# Patient Record
Sex: MALE | Race: WHITE | Employment: FULL TIME | URBAN - METROPOLITAN AREA
[De-identification: names, ages, dates, MRNs, and addresses within clinical notes are randomized per-mention and may not be internally consistent; named-entity substitution may affect disease eponyms.]

---

## 2022-05-21 ENCOUNTER — APPOINTMENT (OUTPATIENT)
Dept: GENERAL RADIOLOGY | Age: 47
DRG: 552 | End: 2022-05-21
Payer: COMMERCIAL

## 2022-05-21 ENCOUNTER — APPOINTMENT (OUTPATIENT)
Dept: MRI IMAGING | Age: 47
DRG: 552 | End: 2022-05-21
Payer: COMMERCIAL

## 2022-05-21 ENCOUNTER — APPOINTMENT (OUTPATIENT)
Dept: CT IMAGING | Age: 47
DRG: 552 | End: 2022-05-21
Payer: COMMERCIAL

## 2022-05-21 ENCOUNTER — HOSPITAL ENCOUNTER (INPATIENT)
Age: 47
LOS: 3 days | Discharge: HOME | DRG: 552 | End: 2022-05-24
Attending: STUDENT IN AN ORGANIZED HEALTH CARE EDUCATION/TRAINING PROGRAM | Admitting: INTERNAL MEDICINE
Payer: COMMERCIAL

## 2022-05-21 DIAGNOSIS — S00.83XA FACIAL CONTUSION, INITIAL ENCOUNTER: ICD-10-CM

## 2022-05-21 DIAGNOSIS — S06.0X9A CONCUSSION WITH LOSS OF CONSCIOUSNESS, INITIAL ENCOUNTER: ICD-10-CM

## 2022-05-21 DIAGNOSIS — S22.061A CLOSED STABLE BURST FRACTURE OF SEVENTH THORACIC VERTEBRA, INITIAL ENCOUNTER (HCC): Primary | ICD-10-CM

## 2022-05-21 DIAGNOSIS — S01.81XA FACIAL LACERATION, INITIAL ENCOUNTER: ICD-10-CM
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
## 2022-05-21 PROBLEM — S22.069A: Status: ACTIVE | Noted: 2022-05-21

## 2022-05-21 LAB
ALBUMIN SERPL-MCNC: 4.1 G/DL (ref 3.4–5)
ALP BLD-CCNC: 46 U/L (ref 40–129)
ALT SERPL-CCNC: 20 U/L (ref 10–40)
ANION GAP SERPL CALCULATED.3IONS-SCNC: 17 MMOL/L (ref 3–16)
ANION GAP SERPL CALCULATED.3IONS-SCNC: 18 MMOL/L (ref 3–16)
APTT: 28.2 SEC (ref 26.2–38.6)
AST SERPL-CCNC: 47 U/L (ref 15–37)
BASOPHILS ABSOLUTE: 0 K/UL (ref 0–0.2)
BASOPHILS RELATIVE PERCENT: 0.2 %
BILIRUB SERPL-MCNC: 0.5 MG/DL (ref 0–1)
BILIRUBIN DIRECT: <0.2 MG/DL (ref 0–0.3)
BILIRUBIN, INDIRECT: ABNORMAL MG/DL (ref 0–1)
BUN BLDV-MCNC: 11 MG/DL (ref 7–20)
BUN BLDV-MCNC: 15 MG/DL (ref 7–20)
CALCIUM SERPL-MCNC: 8.8 MG/DL (ref 8.3–10.6)
CALCIUM SERPL-MCNC: 9.1 MG/DL (ref 8.3–10.6)
CHLORIDE BLD-SCNC: 100 MMOL/L (ref 99–110)
CHLORIDE BLD-SCNC: 104 MMOL/L (ref 99–110)
CO2: 18 MMOL/L (ref 21–32)
CO2: 19 MMOL/L (ref 21–32)
CREAT SERPL-MCNC: 0.8 MG/DL (ref 0.9–1.3)
CREAT SERPL-MCNC: 1 MG/DL (ref 0.9–1.3)
EOSINOPHILS ABSOLUTE: 0 K/UL (ref 0–0.6)
EOSINOPHILS RELATIVE PERCENT: 0.2 %
GFR AFRICAN AMERICAN: >60
GFR AFRICAN AMERICAN: >60
GFR NON-AFRICAN AMERICAN: >60
GFR NON-AFRICAN AMERICAN: >60
GLUCOSE BLD-MCNC: 113 MG/DL (ref 70–99)
GLUCOSE BLD-MCNC: 124 MG/DL (ref 70–99)
HCT VFR BLD CALC: 44.2 % (ref 40.5–52.5)
HEMOGLOBIN: 14.6 G/DL (ref 13.5–17.5)
INR BLD: 1.08 (ref 0.88–1.12)
LIPASE: 19 U/L (ref 13–60)
LYMPHOCYTES ABSOLUTE: 1.7 K/UL (ref 1–5.1)
LYMPHOCYTES RELATIVE PERCENT: 16.8 %
MCH RBC QN AUTO: 31 PG (ref 26–34)
MCHC RBC AUTO-ENTMCNC: 33.1 G/DL (ref 31–36)
MCV RBC AUTO: 93.6 FL (ref 80–100)
MONOCYTES ABSOLUTE: 0.6 K/UL (ref 0–1.3)
MONOCYTES RELATIVE PERCENT: 6.1 %
NEUTROPHILS ABSOLUTE: 7.8 K/UL (ref 1.7–7.7)
NEUTROPHILS RELATIVE PERCENT: 76.7 %
PDW BLD-RTO: 13.4 % (ref 12.4–15.4)
PLATELET # BLD: 233 K/UL (ref 135–450)
PMV BLD AUTO: 8.7 FL (ref 5–10.5)
POTASSIUM SERPL-SCNC: 3.8 MMOL/L (ref 3.5–5.1)
POTASSIUM SERPL-SCNC: 4.4 MMOL/L (ref 3.5–5.1)
PROTHROMBIN TIME: 12.2 SEC (ref 9.9–12.7)
RBC # BLD: 4.72 M/UL (ref 4.2–5.9)
SODIUM BLD-SCNC: 136 MMOL/L (ref 136–145)
SODIUM BLD-SCNC: 140 MMOL/L (ref 136–145)
TOTAL CK: 887 U/L (ref 39–308)
TOTAL PROTEIN: 6.7 G/DL (ref 6.4–8.2)
TROPONIN: <0.01 NG/ML
WBC # BLD: 10.2 K/UL (ref 4–11)

## 2022-05-21 PROCEDURE — 90715 TDAP VACCINE 7 YRS/> IM: CPT | Performed by: STUDENT IN AN ORGANIZED HEALTH CARE EDUCATION/TRAINING PROGRAM

## 2022-05-21 PROCEDURE — 76376 3D RENDER W/INTRP POSTPROCES: CPT

## 2022-05-21 PROCEDURE — 2580000003 HC RX 258: Performed by: INTERNAL MEDICINE

## 2022-05-21 PROCEDURE — 85610 PROTHROMBIN TIME: CPT

## 2022-05-21 PROCEDURE — 6360000002 HC RX W HCPCS

## 2022-05-21 PROCEDURE — 82550 ASSAY OF CK (CPK): CPT

## 2022-05-21 PROCEDURE — 72125 CT NECK SPINE W/O DYE: CPT

## 2022-05-21 PROCEDURE — 36415 COLL VENOUS BLD VENIPUNCTURE: CPT

## 2022-05-21 PROCEDURE — 80048 BASIC METABOLIC PNL TOTAL CA: CPT

## 2022-05-21 PROCEDURE — 85730 THROMBOPLASTIN TIME PARTIAL: CPT

## 2022-05-21 PROCEDURE — 96376 TX/PRO/DX INJ SAME DRUG ADON: CPT

## 2022-05-21 PROCEDURE — 70486 CT MAXILLOFACIAL W/O DYE: CPT

## 2022-05-21 PROCEDURE — 6360000004 HC RX CONTRAST MEDICATION: Performed by: STUDENT IN AN ORGANIZED HEALTH CARE EDUCATION/TRAINING PROGRAM

## 2022-05-21 PROCEDURE — 6360000002 HC RX W HCPCS: Performed by: INTERNAL MEDICINE

## 2022-05-21 PROCEDURE — 80076 HEPATIC FUNCTION PANEL: CPT

## 2022-05-21 PROCEDURE — 83690 ASSAY OF LIPASE: CPT

## 2022-05-21 PROCEDURE — 70450 CT HEAD/BRAIN W/O DYE: CPT

## 2022-05-21 PROCEDURE — 99223 1ST HOSP IP/OBS HIGH 75: CPT | Performed by: SURGERY

## 2022-05-21 PROCEDURE — 6360000002 HC RX W HCPCS: Performed by: STUDENT IN AN ORGANIZED HEALTH CARE EDUCATION/TRAINING PROGRAM

## 2022-05-21 PROCEDURE — 84484 ASSAY OF TROPONIN QUANT: CPT

## 2022-05-21 PROCEDURE — 90471 IMMUNIZATION ADMIN: CPT | Performed by: STUDENT IN AN ORGANIZED HEALTH CARE EDUCATION/TRAINING PROGRAM

## 2022-05-21 PROCEDURE — 72146 MRI CHEST SPINE W/O DYE: CPT

## 2022-05-21 PROCEDURE — 96375 TX/PRO/DX INJ NEW DRUG ADDON: CPT

## 2022-05-21 PROCEDURE — 96374 THER/PROPH/DIAG INJ IV PUSH: CPT

## 2022-05-21 PROCEDURE — 6370000000 HC RX 637 (ALT 250 FOR IP): Performed by: INTERNAL MEDICINE

## 2022-05-21 PROCEDURE — 85025 COMPLETE CBC W/AUTO DIFF WBC: CPT

## 2022-05-21 PROCEDURE — 73030 X-RAY EXAM OF SHOULDER: CPT

## 2022-05-21 PROCEDURE — 1200000000 HC SEMI PRIVATE

## 2022-05-21 PROCEDURE — 71260 CT THORAX DX C+: CPT

## 2022-05-21 PROCEDURE — 99285 EMERGENCY DEPT VISIT HI MDM: CPT

## 2022-05-21 RX ORDER — OXYCODONE HYDROCHLORIDE 5 MG/1
5 TABLET ORAL EVERY 4 HOURS PRN
Status: DISCONTINUED | OUTPATIENT
Start: 2022-05-21 | End: 2022-05-24 | Stop reason: HOSPADM

## 2022-05-21 RX ORDER — SODIUM CHLORIDE 0.9 % (FLUSH) 0.9 %
5-40 SYRINGE (ML) INJECTION EVERY 12 HOURS SCHEDULED
Status: DISCONTINUED | OUTPATIENT
Start: 2022-05-21 | End: 2022-05-24 | Stop reason: HOSPADM

## 2022-05-21 RX ORDER — ACETAMINOPHEN 325 MG/1
650 TABLET ORAL EVERY 6 HOURS PRN
Status: DISCONTINUED | OUTPATIENT
Start: 2022-05-21 | End: 2022-05-24 | Stop reason: HOSPADM

## 2022-05-21 RX ORDER — OMEPRAZOLE 20 MG/1
20 CAPSULE, DELAYED RELEASE ORAL DAILY
COMMUNITY
Start: 2021-01-22

## 2022-05-21 RX ORDER — ACETAMINOPHEN 650 MG/1
650 SUPPOSITORY RECTAL EVERY 6 HOURS PRN
Status: DISCONTINUED | OUTPATIENT
Start: 2022-05-21 | End: 2022-05-24 | Stop reason: HOSPADM

## 2022-05-21 RX ORDER — SODIUM CHLORIDE 9 MG/ML
INJECTION, SOLUTION INTRAVENOUS PRN
Status: DISCONTINUED | OUTPATIENT
Start: 2022-05-21 | End: 2022-05-24 | Stop reason: HOSPADM

## 2022-05-21 RX ORDER — GINSENG 100 MG
CAPSULE ORAL 2 TIMES DAILY
Status: DISCONTINUED | OUTPATIENT
Start: 2022-05-21 | End: 2022-05-24 | Stop reason: HOSPADM

## 2022-05-21 RX ORDER — ONDANSETRON 2 MG/ML
4 INJECTION INTRAMUSCULAR; INTRAVENOUS ONCE
Status: COMPLETED | OUTPATIENT
Start: 2022-05-21 | End: 2022-05-21

## 2022-05-21 RX ORDER — ONDANSETRON 4 MG/1
4 TABLET, ORALLY DISINTEGRATING ORAL EVERY 8 HOURS PRN
Status: DISCONTINUED | OUTPATIENT
Start: 2022-05-21 | End: 2022-05-24 | Stop reason: HOSPADM

## 2022-05-21 RX ORDER — FENTANYL CITRATE 50 UG/ML
75 INJECTION, SOLUTION INTRAMUSCULAR; INTRAVENOUS ONCE
Status: COMPLETED | OUTPATIENT
Start: 2022-05-21 | End: 2022-05-21

## 2022-05-21 RX ORDER — OXYCODONE HYDROCHLORIDE 5 MG/1
10 TABLET ORAL EVERY 4 HOURS PRN
Status: DISCONTINUED | OUTPATIENT
Start: 2022-05-21 | End: 2022-05-24 | Stop reason: HOSPADM

## 2022-05-21 RX ORDER — SODIUM CHLORIDE 0.9 % (FLUSH) 0.9 %
5-40 SYRINGE (ML) INJECTION PRN
Status: DISCONTINUED | OUTPATIENT
Start: 2022-05-21 | End: 2022-05-24 | Stop reason: HOSPADM

## 2022-05-21 RX ORDER — ONDANSETRON 2 MG/ML
4 INJECTION INTRAMUSCULAR; INTRAVENOUS EVERY 6 HOURS PRN
Status: DISCONTINUED | OUTPATIENT
Start: 2022-05-21 | End: 2022-05-24 | Stop reason: HOSPADM

## 2022-05-21 RX ORDER — POLYETHYLENE GLYCOL 3350 17 G/17G
17 POWDER, FOR SOLUTION ORAL DAILY PRN
Status: DISCONTINUED | OUTPATIENT
Start: 2022-05-21 | End: 2022-05-24 | Stop reason: HOSPADM

## 2022-05-21 RX ORDER — LORATADINE 10 MG/1
10 TABLET ORAL PRN
COMMUNITY
Start: 2022-05-12

## 2022-05-21 RX ORDER — ONDANSETRON 2 MG/ML
INJECTION INTRAMUSCULAR; INTRAVENOUS
Status: COMPLETED
Start: 2022-05-21 | End: 2022-05-21

## 2022-05-21 RX ORDER — LORAZEPAM 2 MG/ML
1 INJECTION INTRAMUSCULAR ONCE
Status: COMPLETED | OUTPATIENT
Start: 2022-05-21 | End: 2022-05-21

## 2022-05-21 RX ORDER — SODIUM CHLORIDE, SODIUM LACTATE, POTASSIUM CHLORIDE, CALCIUM CHLORIDE 600; 310; 30; 20 MG/100ML; MG/100ML; MG/100ML; MG/100ML
INJECTION, SOLUTION INTRAVENOUS CONTINUOUS
Status: DISCONTINUED | OUTPATIENT
Start: 2022-05-21 | End: 2022-05-24

## 2022-05-21 RX ADMIN — FENTANYL CITRATE 75 MCG: 50 INJECTION INTRAMUSCULAR; INTRAVENOUS at 14:32

## 2022-05-21 RX ADMIN — SODIUM CHLORIDE, PRESERVATIVE FREE 10 ML: 5 INJECTION INTRAVENOUS at 20:34

## 2022-05-21 RX ADMIN — IOPAMIDOL 80 ML: 755 INJECTION, SOLUTION INTRAVENOUS at 15:32

## 2022-05-21 RX ADMIN — ONDANSETRON 4 MG: 2 INJECTION INTRAMUSCULAR; INTRAVENOUS at 15:41

## 2022-05-21 RX ADMIN — SODIUM CHLORIDE, POTASSIUM CHLORIDE, SODIUM LACTATE AND CALCIUM CHLORIDE: 600; 310; 30; 20 INJECTION, SOLUTION INTRAVENOUS at 21:44

## 2022-05-21 RX ADMIN — HYDROMORPHONE HYDROCHLORIDE 1 MG: 1 INJECTION, SOLUTION INTRAMUSCULAR; INTRAVENOUS; SUBCUTANEOUS at 20:32

## 2022-05-21 RX ADMIN — ONDANSETRON 4 MG: 2 INJECTION INTRAMUSCULAR; INTRAVENOUS at 20:32

## 2022-05-21 RX ADMIN — OXYCODONE 10 MG: 5 TABLET ORAL at 21:45

## 2022-05-21 RX ADMIN — FENTANYL CITRATE 75 MCG: 50 INJECTION INTRAMUSCULAR; INTRAVENOUS at 14:07

## 2022-05-21 RX ADMIN — TETANUS TOXOID, REDUCED DIPHTHERIA TOXOID AND ACELLULAR PERTUSSIS VACCINE, ADSORBED 0.5 ML: 5; 2.5; 8; 8; 2.5 SUSPENSION INTRAMUSCULAR at 17:22

## 2022-05-21 RX ADMIN — LORAZEPAM 1 MG: 2 INJECTION INTRAMUSCULAR; INTRAVENOUS at 18:17

## 2022-05-21 RX ADMIN — METHOCARBAMOL 750 MG: 100 INJECTION, SOLUTION INTRAMUSCULAR; INTRAVENOUS at 21:45

## 2022-05-21 RX ADMIN — ONDANSETRON 4 MG: 2 INJECTION INTRAMUSCULAR; INTRAVENOUS at 14:08

## 2022-05-21 ASSESSMENT — PAIN DESCRIPTION - LOCATION
LOCATION: BACK
LOCATION: BACK;ABDOMEN
LOCATION: FACE
LOCATION: BACK;ABDOMEN

## 2022-05-21 ASSESSMENT — PAIN SCALES - GENERAL
PAINLEVEL_OUTOF10: 7
PAINLEVEL_OUTOF10: 4
PAINLEVEL_OUTOF10: 8
PAINLEVEL_OUTOF10: 10
PAINLEVEL_OUTOF10: 9
PAINLEVEL_OUTOF10: 10

## 2022-05-21 ASSESSMENT — PAIN DESCRIPTION - ONSET: ONSET: ON-GOING

## 2022-05-21 ASSESSMENT — PAIN DESCRIPTION - FREQUENCY: FREQUENCY: CONTINUOUS

## 2022-05-21 ASSESSMENT — PAIN DESCRIPTION - DIRECTION: RADIATING_TOWARDS: ABDOMEN

## 2022-05-21 ASSESSMENT — PAIN DESCRIPTION - DESCRIPTORS
DESCRIPTORS: THROBBING;STABBING
DESCRIPTORS: THROBBING;ACHING
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
## 2022-05-21 NOTE — LETTER
[facility] 5T Ortho/Neuro  [address]  [facility] [address]  Phone: [phone]             May 24, 2022    Patient: Owen Jasso   YOB: 1975   Date of Visit: 5/21/2022       To Whom It May Concern:    Owen Jasso was seen and treated in our facility  beginning 5/21/2022 until 5/24/2022. He needs to remain off work for 2 weeks until cleared by neurosurgery.        Sincerely,       Phyllis Becerra MD         Signature:__________________________________
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
## 2022-05-22 ENCOUNTER — APPOINTMENT (OUTPATIENT)
Dept: GENERAL RADIOLOGY | Age: 47
DRG: 552 | End: 2022-05-22
Payer: COMMERCIAL

## 2022-05-22 PROBLEM — K21.9 GASTROESOPHAGEAL REFLUX DISEASE: Status: ACTIVE | Noted: 2021-01-22

## 2022-05-22 PROBLEM — J30.1 SEASONAL ALLERGIC RHINITIS DUE TO POLLEN: Status: ACTIVE | Noted: 2021-01-22

## 2022-05-22 PROBLEM — K64.8 INTERNAL HEMORRHOIDS WITHOUT COMPLICATION: Status: ACTIVE | Noted: 2021-01-22

## 2022-05-22 PROBLEM — S22.061A CLOSED STABLE BURST FRACTURE OF T7 VERTEBRA (HCC): Status: ACTIVE | Noted: 2022-05-21

## 2022-05-22 LAB
A/G RATIO: 1.7 (ref 1.1–2.2)
ALBUMIN SERPL-MCNC: 4 G/DL (ref 3.4–5)
ALP BLD-CCNC: 45 U/L (ref 40–129)
ALT SERPL-CCNC: 17 U/L (ref 10–40)
ANION GAP SERPL CALCULATED.3IONS-SCNC: 11 MMOL/L (ref 3–16)
AST SERPL-CCNC: 33 U/L (ref 15–37)
BASOPHILS ABSOLUTE: 0 K/UL (ref 0–0.2)
BASOPHILS RELATIVE PERCENT: 0.2 %
BILIRUB SERPL-MCNC: 1.1 MG/DL (ref 0–1)
BUN BLDV-MCNC: 8 MG/DL (ref 7–20)
CALCIUM SERPL-MCNC: 9 MG/DL (ref 8.3–10.6)
CHLORIDE BLD-SCNC: 105 MMOL/L (ref 99–110)
CO2: 24 MMOL/L (ref 21–32)
CREAT SERPL-MCNC: 1.1 MG/DL (ref 0.9–1.3)
EOSINOPHILS ABSOLUTE: 0 K/UL (ref 0–0.6)
EOSINOPHILS RELATIVE PERCENT: 0.2 %
GFR AFRICAN AMERICAN: >60
GFR NON-AFRICAN AMERICAN: >60
GLUCOSE BLD-MCNC: 95 MG/DL (ref 70–99)
HCT VFR BLD CALC: 41.9 % (ref 40.5–52.5)
HEMOGLOBIN: 14.2 G/DL (ref 13.5–17.5)
LYMPHOCYTES ABSOLUTE: 1.7 K/UL (ref 1–5.1)
LYMPHOCYTES RELATIVE PERCENT: 22.4 %
MAGNESIUM: 2.2 MG/DL (ref 1.8–2.4)
MCH RBC QN AUTO: 31.2 PG (ref 26–34)
MCHC RBC AUTO-ENTMCNC: 33.8 G/DL (ref 31–36)
MCV RBC AUTO: 92.6 FL (ref 80–100)
MONOCYTES ABSOLUTE: 0.7 K/UL (ref 0–1.3)
MONOCYTES RELATIVE PERCENT: 10 %
NEUTROPHILS ABSOLUTE: 5 K/UL (ref 1.7–7.7)
NEUTROPHILS RELATIVE PERCENT: 67.2 %
PDW BLD-RTO: 13.2 % (ref 12.4–15.4)
PHOSPHORUS: 3.1 MG/DL (ref 2.5–4.9)
PLATELET # BLD: 211 K/UL (ref 135–450)
PMV BLD AUTO: 8.5 FL (ref 5–10.5)
POTASSIUM REFLEX MAGNESIUM: 4.2 MMOL/L (ref 3.5–5.1)
RBC # BLD: 4.53 M/UL (ref 4.2–5.9)
SODIUM BLD-SCNC: 140 MMOL/L (ref 136–145)
TOTAL CK: 770 U/L (ref 39–308)
TOTAL PROTEIN: 6.4 G/DL (ref 6.4–8.2)
WBC # BLD: 7.4 K/UL (ref 4–11)

## 2022-05-22 PROCEDURE — 1200000000 HC SEMI PRIVATE

## 2022-05-22 PROCEDURE — 36415 COLL VENOUS BLD VENIPUNCTURE: CPT

## 2022-05-22 PROCEDURE — 84100 ASSAY OF PHOSPHORUS: CPT

## 2022-05-22 PROCEDURE — 2580000003 HC RX 258: Performed by: INTERNAL MEDICINE

## 2022-05-22 PROCEDURE — 99232 SBSQ HOSP IP/OBS MODERATE 35: CPT | Performed by: SURGERY

## 2022-05-22 PROCEDURE — 83735 ASSAY OF MAGNESIUM: CPT

## 2022-05-22 PROCEDURE — 80053 COMPREHEN METABOLIC PANEL: CPT

## 2022-05-22 PROCEDURE — 6370000000 HC RX 637 (ALT 250 FOR IP): Performed by: INTERNAL MEDICINE

## 2022-05-22 PROCEDURE — 6370000000 HC RX 637 (ALT 250 FOR IP): Performed by: STUDENT IN AN ORGANIZED HEALTH CARE EDUCATION/TRAINING PROGRAM

## 2022-05-22 PROCEDURE — 72070 X-RAY EXAM THORAC SPINE 2VWS: CPT

## 2022-05-22 PROCEDURE — 85025 COMPLETE CBC W/AUTO DIFF WBC: CPT

## 2022-05-22 PROCEDURE — 6360000002 HC RX W HCPCS: Performed by: INTERNAL MEDICINE

## 2022-05-22 PROCEDURE — 82550 ASSAY OF CK (CPK): CPT

## 2022-05-22 RX ORDER — SENNA AND DOCUSATE SODIUM 50; 8.6 MG/1; MG/1
2 TABLET, FILM COATED ORAL 2 TIMES DAILY
Status: DISCONTINUED | OUTPATIENT
Start: 2022-05-22 | End: 2022-05-24 | Stop reason: HOSPADM

## 2022-05-22 RX ORDER — POLYETHYLENE GLYCOL 3350 17 G/17G
17 POWDER, FOR SOLUTION ORAL DAILY
Status: DISCONTINUED | OUTPATIENT
Start: 2022-05-22 | End: 2022-05-24 | Stop reason: HOSPADM

## 2022-05-22 RX ADMIN — POLYETHYLENE GLYCOL 3350 17 G: 17 POWDER, FOR SOLUTION ORAL at 18:14

## 2022-05-22 RX ADMIN — BACITRACIN: 500 OINTMENT TOPICAL at 20:24

## 2022-05-22 RX ADMIN — HYDROMORPHONE HYDROCHLORIDE 1 MG: 1 INJECTION, SOLUTION INTRAMUSCULAR; INTRAVENOUS; SUBCUTANEOUS at 01:04

## 2022-05-22 RX ADMIN — OXYCODONE 10 MG: 5 TABLET ORAL at 18:14

## 2022-05-22 RX ADMIN — METHOCARBAMOL 750 MG: 100 INJECTION, SOLUTION INTRAMUSCULAR; INTRAVENOUS at 09:06

## 2022-05-22 RX ADMIN — HYDROMORPHONE HYDROCHLORIDE 1 MG: 1 INJECTION, SOLUTION INTRAMUSCULAR; INTRAVENOUS; SUBCUTANEOUS at 10:05

## 2022-05-22 RX ADMIN — HYDROMORPHONE HYDROCHLORIDE 1 MG: 1 INJECTION, SOLUTION INTRAMUSCULAR; INTRAVENOUS; SUBCUTANEOUS at 19:32

## 2022-05-22 RX ADMIN — OXYCODONE 10 MG: 5 TABLET ORAL at 02:46

## 2022-05-22 RX ADMIN — METHOCARBAMOL 750 MG: 100 INJECTION, SOLUTION INTRAMUSCULAR; INTRAVENOUS at 20:23

## 2022-05-22 RX ADMIN — SODIUM CHLORIDE, PRESERVATIVE FREE 10 ML: 5 INJECTION INTRAVENOUS at 09:04

## 2022-05-22 RX ADMIN — SODIUM CHLORIDE, POTASSIUM CHLORIDE, SODIUM LACTATE AND CALCIUM CHLORIDE: 600; 310; 30; 20 INJECTION, SOLUTION INTRAVENOUS at 07:29

## 2022-05-22 RX ADMIN — SODIUM CHLORIDE, POTASSIUM CHLORIDE, SODIUM LACTATE AND CALCIUM CHLORIDE: 600; 310; 30; 20 INJECTION, SOLUTION INTRAVENOUS at 23:36

## 2022-05-22 RX ADMIN — METHOCARBAMOL 750 MG: 100 INJECTION, SOLUTION INTRAMUSCULAR; INTRAVENOUS at 02:47

## 2022-05-22 RX ADMIN — OXYCODONE 10 MG: 5 TABLET ORAL at 13:41

## 2022-05-22 RX ADMIN — HYDROMORPHONE HYDROCHLORIDE 1 MG: 1 INJECTION, SOLUTION INTRAMUSCULAR; INTRAVENOUS; SUBCUTANEOUS at 05:52

## 2022-05-22 RX ADMIN — SODIUM CHLORIDE, POTASSIUM CHLORIDE, SODIUM LACTATE AND CALCIUM CHLORIDE: 600; 310; 30; 20 INJECTION, SOLUTION INTRAVENOUS at 16:10

## 2022-05-22 RX ADMIN — METHOCARBAMOL 750 MG: 100 INJECTION, SOLUTION INTRAMUSCULAR; INTRAVENOUS at 13:43

## 2022-05-22 RX ADMIN — BACITRACIN: 500 OINTMENT TOPICAL at 01:20

## 2022-05-22 RX ADMIN — OXYCODONE 10 MG: 5 TABLET ORAL at 06:57

## 2022-05-22 RX ADMIN — OXYCODONE 10 MG: 5 TABLET ORAL at 23:33

## 2022-05-22 RX ADMIN — BACITRACIN: 500 OINTMENT TOPICAL at 09:05

## 2022-05-22 RX ADMIN — HYDROMORPHONE HYDROCHLORIDE 1 MG: 1 INJECTION, SOLUTION INTRAMUSCULAR; INTRAVENOUS; SUBCUTANEOUS at 15:16

## 2022-05-22 ASSESSMENT — PAIN SCALES - GENERAL
PAINLEVEL_OUTOF10: 8
PAINLEVEL_OUTOF10: 9
PAINLEVEL_OUTOF10: 9
PAINLEVEL_OUTOF10: 8
PAINLEVEL_OUTOF10: 7
PAINLEVEL_OUTOF10: 7
PAINLEVEL_OUTOF10: 6
PAINLEVEL_OUTOF10: 7
PAINLEVEL_OUTOF10: 7
PAINLEVEL_OUTOF10: 8
PAINLEVEL_OUTOF10: 0
PAINLEVEL_OUTOF10: 7
PAINLEVEL_OUTOF10: 8

## 2022-05-22 ASSESSMENT — PAIN DESCRIPTION - DESCRIPTORS
DESCRIPTORS: ACHING;DISCOMFORT
DESCRIPTORS: ACHING;STABBING

## 2022-05-22 ASSESSMENT — PAIN - FUNCTIONAL ASSESSMENT
PAIN_FUNCTIONAL_ASSESSMENT: ACTIVITIES ARE NOT PREVENTED

## 2022-05-22 ASSESSMENT — PAIN DESCRIPTION - ONSET
ONSET: ON-GOING

## 2022-05-22 ASSESSMENT — PAIN DESCRIPTION - FREQUENCY
FREQUENCY: CONTINUOUS

## 2022-05-22 ASSESSMENT — PAIN DESCRIPTION - LOCATION
LOCATION: BACK
LOCATION: BACK;ABDOMEN
LOCATION: BACK;ABDOMEN
LOCATION: ABDOMEN;BACK
LOCATION: BACK;ABDOMEN
LOCATION: BACK

## 2022-05-22 ASSESSMENT — PAIN DESCRIPTION - PAIN TYPE
TYPE: ACUTE PAIN
TYPE: ACUTE PAIN

## 2022-05-22 ASSESSMENT — PAIN DESCRIPTION - DIRECTION
RADIATING_TOWARDS: ABDOMEN
RADIATING_TOWARDS: ABDOMEN

## 2022-05-22 NOTE — PROGRESS NOTES
Point of Care Note:  General Surgery  Donna Lucie    4:23 AM  5/22/2022    Serial abdominal exam:    Patient seen and evaluated at bedside. Serial abdominal exam.  The patient states that his abdominal pain is improved from the last time. He states it feels sore like after working out.   On exam he is soft, nondistended, no palpable masses, nontender.    -We will continue to monitor    Noni Shepard DO  PGY1, General Surgery  05/22/22  4:44 AM  [phone]

## 2022-05-22 NOTE — PLAN OF CARE
Problem: Pain  Goal: Verbalizes/displays adequate comfort level or baseline comfort level  Outcome: Progressing   Pain 8-9/10 prior to intervention and 6/10 after intervention. Pain meds required RTC. Pt bedrest with brace on. Problem: Skin/Tissue Integrity  Goal: Absence of new skin breakdown  Description: 1. Monitor for areas of redness and/or skin breakdown  2. Assess vascular access sites hourly  3. Every 4-6 hours minimum:  Change oxygen saturation probe site  4. Every 4-6 hours:  If on nasal continuous positive airway pressure, respiratory therapy assess nares and determine need for appliance change or resting period. Outcome: Progressing   No new breakdown, see flow sheets for existing skin issues.
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
## 2022-05-22 NOTE — PROGRESS NOTES
4 Eyes Admission Assessment     I agree as the admission nurse that 2 RN's have performed a thorough Head to Toe Skin Assessment on the patient. ALL assessment sites listed below have been assessed on admission. Areas assessed by both nurses:   [x]   Head, Face, and Ears   [x]   Shoulders, Back, and Chest  [x]   Arms, Elbows, and Hands   [x]   Coccyx, Sacrum, and Ischium  [x]   Legs, Feet, and Heels        Does the Patient have Skin Breakdown? R periorbital laceration and bruising. Lacerations to lips scattered bruising to BUE. Tear L elbow.         Delfin Prevention initiated:  Yes   Wound Care Orders initiated:  NA      WOC nurse consulted for Pressure Injury (Stage 3,4, Unstageable, DTI, NWPT, and Complex wounds) or Delfin score 18 or lower:  NA      Nurse 1 eSignature: Electronically signed by Ry Jaramillo RN on 5/21/22 at 8:25 PM EDT    **SHARE this note so that the co-signing nurse is able to place an eSignature**    Nurse 2 eSignature: Electronically signed by Katherine Hernandez RN on 5/21/22 at 8:55 PM EDT
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
## 2022-05-22 NOTE — PROGRESS NOTES
Admitted to 5 tower. VSS. Pain 8/10. TLSO and C collar on and aligned. Tele on pt. Pt bedrest, urinal to void and will medicate him for the pain. Med req complete. Bed alarm set. Call light in reach. Will continue to monitor. NV intact, full sensation.
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
## 2022-05-22 NOTE — PLAN OF CARE
Problem: Discharge Planning  Goal: Discharge to home or other facility with appropriate resources  5/22/2022 1634 by Ronel Sosa RN  Outcome: Progressing  Flowsheets  Taken 5/22/2022 0859 by Ronel Sosa RN  Discharge to home or other facility with appropriate resources:   Identify barriers to discharge with patient and caregiver   Arrange for needed discharge resources and transportation as appropriate   Identify discharge learning needs (meds, wound care, etc)   Refer to discharge planning if patient needs post-hospital services based on physician order or complex needs related to functional status, cognitive ability or social support system  Taken 5/22/2022 0417 by Shaka Woodall RN  Discharge to home or other facility with appropriate resources: Identify barriers to discharge with patient and caregiver  5/22/2022 1634 by Ronel Sosa RN  Outcome: Progressing  Flowsheets  Taken 5/22/2022 0859 by Ronel Sosa RN  Discharge to home or other facility with appropriate resources:   Identify barriers to discharge with patient and caregiver   Arrange for needed discharge resources and transportation as appropriate   Identify discharge learning needs (meds, wound care, etc)   Refer to discharge planning if patient needs post-hospital services based on physician order or complex needs related to functional status, cognitive ability or social support system  Taken 5/22/2022 0417 by Shaka Woodall RN  Discharge to home or other facility with appropriate resources: Identify barriers to discharge with patient and caregiver     Problem: Pain  Goal: Verbalizes/displays adequate comfort level or baseline comfort level  5/22/2022 1634 by Ronel Sosa RN  Outcome: Progressing  Flowsheets  Taken 5/22/2022 1500  Verbalizes/displays adequate comfort level or baseline comfort level:   Encourage patient to monitor pain and request assistance   Assess pain using appropriate pain scale   Implement non-pharmacological measures as appropriate and evaluate response   Administer analgesics based on type and severity of pain and evaluate response   Consider cultural and social influences on pain and pain management   Notify Licensed Independent Practitioner if interventions unsuccessful or patient reports new pain  Taken 5/22/2022 1005  Verbalizes/displays adequate comfort level or baseline comfort level:   Encourage patient to monitor pain and request assistance   Assess pain using appropriate pain scale   Administer analgesics based on type and severity of pain and evaluate response   Implement non-pharmacological measures as appropriate and evaluate response   Consider cultural and social influences on pain and pain management   Notify Licensed Independent Practitioner if interventions unsuccessful or patient reports new pain  5/22/2022 1634 by Claudeen Alto, RN  Outcome: Progressing  Flowsheets  Taken 5/22/2022 1500  Verbalizes/displays adequate comfort level or baseline comfort level:   Encourage patient to monitor pain and request assistance   Assess pain using appropriate pain scale   Implement non-pharmacological measures as appropriate and evaluate response   Administer analgesics based on type and severity of pain and evaluate response   Consider cultural and social influences on pain and pain management   Notify Licensed Independent Practitioner if interventions unsuccessful or patient reports new pain  Taken 5/22/2022 1005  Verbalizes/displays adequate comfort level or baseline comfort level:   Encourage patient to monitor pain and request assistance   Assess pain using appropriate pain scale   Administer analgesics based on type and severity of pain and evaluate response   Implement non-pharmacological measures as appropriate and evaluate response   Consider cultural and social influences on pain and pain management   Notify Licensed Independent Practitioner if interventions unsuccessful or patient reports new pain  5/22/2022 0428 by Bruno Miller RN  Outcome: Progressing     Problem: Skin/Tissue Integrity  Goal: Absence of new skin breakdown  Description: 1. Monitor for areas of redness and/or skin breakdown  2. Assess vascular access sites hourly  3. Every 4-6 hours minimum:  Change oxygen saturation probe site  4. Every 4-6 hours:  If on nasal continuous positive airway pressure, respiratory therapy assess nares and determine need for appliance change or resting period.   5/22/2022 1634 by Dung Boudreaux RN  Outcome: Progressing  5/22/2022 1634 by Dung Boudreaux RN  Outcome: Progressing  5/22/2022 0428 by Bruno Miller RN  Outcome: Progressing     Problem: Safety - Adult  Goal: Free from fall injury  5/22/2022 1634 by Dung Boudreaux RN  Outcome: Progressing  Flowsheets (Taken 5/22/2022 0903)  Free From Fall Injury:   Yovani Srivastava family/caregiver on patient safety   Based on caregiver fall risk screen, instruct family/caregiver to ask for assistance with transferring infant if caregiver noted to have fall risk factors  5/22/2022 1634 by Dung Boudreaux RN  Outcome: Progressing  Flowsheets (Taken 5/22/2022 0903)  Free From Fall Injury:   Instruct family/caregiver on patient safety   Based on caregiver fall risk screen, instruct family/caregiver to ask for assistance with transferring infant if caregiver noted to have fall risk factors     Problem: Neurosensory - Adult  Goal: Achieves maximal functionality and self care  5/22/2022 1634 by Dung Boudreaux RN  Outcome: Progressing  5/22/2022 1634 by Dung Boudreaux RN  Outcome: Progressing     Problem: Cardiovascular - Adult  Goal: Maintains optimal cardiac output and hemodynamic stability  5/22/2022 1634 by Dung Boudreaux RN  Outcome: Progressing  5/22/2022 1634 by Dung Boudreaux RN  Outcome: Progressing  Goal: Absence of cardiac dysrhythmias or at baseline  5/22/2022 1634 by Dung Boudreaux RN  Outcome: Progressing  5/22/2022 1634 by Keily Bacon RN  Outcome: Progressing     Problem: Skin/Tissue Integrity - Adult  Goal: Skin integrity remains intact  5/22/2022 1634 by Keily Bacon RN  Outcome: Progressing  Flowsheets  Taken 5/22/2022 0903  Skin Integrity Remains Intact:   Monitor for areas of redness and/or skin breakdown   Every 4-6 hours minimum: Change oxygen saturation probe site   Every 4-6 hours: If on nasal continuous positive airway pressure, respiratory therapy assesses nares and determine need for appliance change or resting period  Taken 5/22/2022 0859  Skin Integrity Remains Intact:   Monitor for areas of redness and/or skin breakdown   Every 4-6 hours minimum: Change oxygen saturation probe site   Every 4-6 hours: If on nasal continuous positive airway pressure, respiratory therapy assesses nares and determine need for appliance change or resting period  5/22/2022 1634 by Keily Bacon RN  Outcome: Progressing  Flowsheets  Taken 5/22/2022 0903  Skin Integrity Remains Intact:   Monitor for areas of redness and/or skin breakdown   Every 4-6 hours minimum: Change oxygen saturation probe site   Every 4-6 hours: If on nasal continuous positive airway pressure, respiratory therapy assesses nares and determine need for appliance change or resting period  Taken 5/22/2022 0859  Skin Integrity Remains Intact:   Monitor for areas of redness and/or skin breakdown   Every 4-6 hours minimum: Change oxygen saturation probe site   Every 4-6 hours: If on nasal continuous positive airway pressure, respiratory therapy assesses nares and determine need for appliance change or resting period  Goal: Incisions, wounds, or drain sites healing without S/S of infection  5/22/2022 1634 by Keily Bacon RN  Outcome: Progressing  5/22/2022 1634 by Keily Bacon RN  Outcome: Progressing     Problem: Musculoskeletal - Adult  Goal: Maintain proper alignment of affected body part  5/22/2022 1634 by Keily Bacon RN  Outcome: Progressing  5/22/2022 1634 by Claudeen Alto, RN  Outcome: Progressing     Problem: Gastrointestinal - Adult  Goal: Minimal or absence of nausea and vomiting  5/22/2022 1634 by Claudeen Alto, RN  Outcome: Progressing  5/22/2022 1634 by Claudeen Alto, RN  Outcome: Progressing  Goal: Maintains or returns to baseline bowel function  5/22/2022 1634 by Claudeen Alto, RN  Outcome: Progressing  5/22/2022 1634 by Claudeen Alto, RN  Outcome: Progressing  Goal: Maintains adequate nutritional intake  5/22/2022 1634 by Claudeen Alto, RN  Outcome: Progressing  5/22/2022 1634 by Claudeen Alto, RN  Outcome: Progressing     Problem: Genitourinary - Adult  Goal: Absence of urinary retention  5/22/2022 1634 by Claudeen Alto, RN  Outcome: Progressing  5/22/2022 1634 by Claudeen Alto, RN  Outcome: Progressing     Problem: Infection - Adult  Goal: Absence of infection at discharge  5/22/2022 1634 by Claudeen Alto, RN  Outcome: Progressing  5/22/2022 1634 by Claudeen Alto, RN  Outcome: Progressing  Goal: Absence of infection during hospitalization  5/22/2022 1634 by Claudeen Alto, RN  Outcome: Progressing  5/22/2022 1634 by Claudeen Alto, RN  Outcome: Progressing     Problem: Metabolic/Fluid and Electrolytes - Adult  Goal: Electrolytes maintained within normal limits  5/22/2022 1634 by Claudeen Alto, RN  Outcome: Progressing  5/22/2022 1634 by Claudeen Alto, RN  Outcome: Progressing  Goal: Hemodynamic stability and optimal renal function maintained  5/22/2022 1634 by Claudeen Alto, RN  Outcome: Progressing  5/22/2022 1634 by Claudeen Alto, RN  Outcome: Progressing     Problem: Hematologic - Adult  Goal: Maintains hematologic stability  5/22/2022 1634 by Claudeen Alto, RN  Outcome: Progressing  5/22/2022 1634 by Claudeen Alto, RN  Outcome: Progressing
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
## 2022-05-23 ENCOUNTER — APPOINTMENT (OUTPATIENT)
Dept: CT IMAGING | Age: 47
DRG: 552 | End: 2022-05-23
Payer: COMMERCIAL

## 2022-05-23 LAB
ALBUMIN SERPL-MCNC: 3.8 G/DL (ref 3.4–5)
ALBUMIN SERPL-MCNC: 4.2 G/DL (ref 3.4–5)
ALP BLD-CCNC: 47 U/L (ref 40–129)
ALT SERPL-CCNC: 14 U/L (ref 10–40)
ANION GAP SERPL CALCULATED.3IONS-SCNC: 15 MMOL/L (ref 3–16)
AST SERPL-CCNC: 25 U/L (ref 15–37)
BASOPHILS ABSOLUTE: 0 K/UL (ref 0–0.2)
BASOPHILS RELATIVE PERCENT: 0.3 %
BILIRUB SERPL-MCNC: 1 MG/DL (ref 0–1)
BILIRUBIN DIRECT: <0.2 MG/DL (ref 0–0.3)
BILIRUBIN, INDIRECT: NORMAL MG/DL (ref 0–1)
BUN BLDV-MCNC: 6 MG/DL (ref 7–20)
CALCIUM SERPL-MCNC: 9.2 MG/DL (ref 8.3–10.6)
CHLORIDE BLD-SCNC: 101 MMOL/L (ref 99–110)
CO2: 22 MMOL/L (ref 21–32)
CREAT SERPL-MCNC: 0.9 MG/DL (ref 0.9–1.3)
EOSINOPHILS ABSOLUTE: 0.1 K/UL (ref 0–0.6)
EOSINOPHILS RELATIVE PERCENT: 0.9 %
GFR AFRICAN AMERICAN: >60
GFR NON-AFRICAN AMERICAN: >60
GLUCOSE BLD-MCNC: 101 MG/DL (ref 70–99)
HCT VFR BLD CALC: 43.1 % (ref 40.5–52.5)
HEMOGLOBIN: 14.7 G/DL (ref 13.5–17.5)
LACTIC ACID: 1.5 MMOL/L (ref 0.4–2)
LIPASE: 17 U/L (ref 13–60)
LYMPHOCYTES ABSOLUTE: 1.3 K/UL (ref 1–5.1)
LYMPHOCYTES RELATIVE PERCENT: 20.3 %
MAGNESIUM: 2 MG/DL (ref 1.8–2.4)
MCH RBC QN AUTO: 31.5 PG (ref 26–34)
MCHC RBC AUTO-ENTMCNC: 34.1 G/DL (ref 31–36)
MCV RBC AUTO: 92.5 FL (ref 80–100)
MONOCYTES ABSOLUTE: 0.6 K/UL (ref 0–1.3)
MONOCYTES RELATIVE PERCENT: 9.6 %
NEUTROPHILS ABSOLUTE: 4.5 K/UL (ref 1.7–7.7)
NEUTROPHILS RELATIVE PERCENT: 68.9 %
PDW BLD-RTO: 13.3 % (ref 12.4–15.4)
PHOSPHORUS: 3.2 MG/DL (ref 2.5–4.9)
PLATELET # BLD: 191 K/UL (ref 135–450)
PMV BLD AUTO: 8.3 FL (ref 5–10.5)
POTASSIUM SERPL-SCNC: 4.2 MMOL/L (ref 3.5–5.1)
RBC # BLD: 4.66 M/UL (ref 4.2–5.9)
SODIUM BLD-SCNC: 138 MMOL/L (ref 136–145)
TOTAL CK: 576 U/L (ref 39–308)
TOTAL PROTEIN: 6.5 G/DL (ref 6.4–8.2)
WBC # BLD: 6.6 K/UL (ref 4–11)

## 2022-05-23 PROCEDURE — 80076 HEPATIC FUNCTION PANEL: CPT

## 2022-05-23 PROCEDURE — 83690 ASSAY OF LIPASE: CPT

## 2022-05-23 PROCEDURE — 83605 ASSAY OF LACTIC ACID: CPT

## 2022-05-23 PROCEDURE — 2580000003 HC RX 258: Performed by: INTERNAL MEDICINE

## 2022-05-23 PROCEDURE — 6360000004 HC RX CONTRAST MEDICATION: Performed by: STUDENT IN AN ORGANIZED HEALTH CARE EDUCATION/TRAINING PROGRAM

## 2022-05-23 PROCEDURE — 36415 COLL VENOUS BLD VENIPUNCTURE: CPT

## 2022-05-23 PROCEDURE — 85025 COMPLETE CBC W/AUTO DIFF WBC: CPT

## 2022-05-23 PROCEDURE — 6370000000 HC RX 637 (ALT 250 FOR IP): Performed by: INTERNAL MEDICINE

## 2022-05-23 PROCEDURE — 83735 ASSAY OF MAGNESIUM: CPT

## 2022-05-23 PROCEDURE — 97530 THERAPEUTIC ACTIVITIES: CPT

## 2022-05-23 PROCEDURE — 97535 SELF CARE MNGMENT TRAINING: CPT

## 2022-05-23 PROCEDURE — 1200000000 HC SEMI PRIVATE

## 2022-05-23 PROCEDURE — 80069 RENAL FUNCTION PANEL: CPT

## 2022-05-23 PROCEDURE — 6360000002 HC RX W HCPCS: Performed by: INTERNAL MEDICINE

## 2022-05-23 PROCEDURE — 74177 CT ABD & PELVIS W/CONTRAST: CPT

## 2022-05-23 PROCEDURE — 82550 ASSAY OF CK (CPK): CPT

## 2022-05-23 PROCEDURE — 97165 OT EVAL LOW COMPLEX 30 MIN: CPT

## 2022-05-23 PROCEDURE — 97161 PT EVAL LOW COMPLEX 20 MIN: CPT

## 2022-05-23 RX ADMIN — SODIUM CHLORIDE, PRESERVATIVE FREE 10 ML: 5 INJECTION INTRAVENOUS at 09:00

## 2022-05-23 RX ADMIN — HYDROMORPHONE HYDROCHLORIDE 1 MG: 1 INJECTION, SOLUTION INTRAMUSCULAR; INTRAVENOUS; SUBCUTANEOUS at 05:17

## 2022-05-23 RX ADMIN — BACITRACIN: 500 OINTMENT TOPICAL at 20:35

## 2022-05-23 RX ADMIN — IOHEXOL 50 ML: 240 INJECTION, SOLUTION INTRATHECAL; INTRAVASCULAR; INTRAVENOUS; ORAL at 16:01

## 2022-05-23 RX ADMIN — SENNOSIDES AND DOCUSATE SODIUM 2 TABLET: 50; 8.6 TABLET ORAL at 08:14

## 2022-05-23 RX ADMIN — SODIUM CHLORIDE, POTASSIUM CHLORIDE, SODIUM LACTATE AND CALCIUM CHLORIDE: 600; 310; 30; 20 INJECTION, SOLUTION INTRAVENOUS at 09:02

## 2022-05-23 RX ADMIN — METHOCARBAMOL 750 MG: 100 INJECTION, SOLUTION INTRAMUSCULAR; INTRAVENOUS at 14:23

## 2022-05-23 RX ADMIN — METHOCARBAMOL 750 MG: 100 INJECTION, SOLUTION INTRAMUSCULAR; INTRAVENOUS at 08:18

## 2022-05-23 RX ADMIN — OXYCODONE 10 MG: 5 TABLET ORAL at 04:10

## 2022-05-23 RX ADMIN — POLYETHYLENE GLYCOL 3350 17 G: 17 POWDER, FOR SOLUTION ORAL at 08:15

## 2022-05-23 RX ADMIN — OXYCODONE 10 MG: 5 TABLET ORAL at 08:14

## 2022-05-23 RX ADMIN — HYDROMORPHONE HYDROCHLORIDE 1 MG: 1 INJECTION, SOLUTION INTRAMUSCULAR; INTRAVENOUS; SUBCUTANEOUS at 09:57

## 2022-05-23 RX ADMIN — HYDROMORPHONE HYDROCHLORIDE 1 MG: 1 INJECTION, SOLUTION INTRAMUSCULAR; INTRAVENOUS; SUBCUTANEOUS at 22:42

## 2022-05-23 RX ADMIN — METHOCARBAMOL 750 MG: 100 INJECTION, SOLUTION INTRAMUSCULAR; INTRAVENOUS at 20:36

## 2022-05-23 RX ADMIN — METHOCARBAMOL 750 MG: 100 INJECTION, SOLUTION INTRAMUSCULAR; INTRAVENOUS at 02:55

## 2022-05-23 RX ADMIN — HYDROMORPHONE HYDROCHLORIDE 1 MG: 1 INJECTION, SOLUTION INTRAMUSCULAR; INTRAVENOUS; SUBCUTANEOUS at 18:40

## 2022-05-23 RX ADMIN — OXYCODONE 10 MG: 5 TABLET ORAL at 13:10

## 2022-05-23 RX ADMIN — HYDROMORPHONE HYDROCHLORIDE 1 MG: 1 INJECTION, SOLUTION INTRAMUSCULAR; INTRAVENOUS; SUBCUTANEOUS at 14:18

## 2022-05-23 RX ADMIN — OXYCODONE 10 MG: 5 TABLET ORAL at 21:42

## 2022-05-23 RX ADMIN — OXYCODONE 10 MG: 5 TABLET ORAL at 17:27

## 2022-05-23 RX ADMIN — SODIUM CHLORIDE, PRESERVATIVE FREE 10 ML: 5 INJECTION INTRAVENOUS at 09:58

## 2022-05-23 RX ADMIN — IOPAMIDOL 75 ML: 755 INJECTION, SOLUTION INTRAVENOUS at 16:01

## 2022-05-23 RX ADMIN — HYDROMORPHONE HYDROCHLORIDE 1 MG: 1 INJECTION, SOLUTION INTRAMUSCULAR; INTRAVENOUS; SUBCUTANEOUS at 00:34

## 2022-05-23 RX ADMIN — BACITRACIN: 500 OINTMENT TOPICAL at 08:15

## 2022-05-23 ASSESSMENT — PAIN SCALES - GENERAL
PAINLEVEL_OUTOF10: 7
PAINLEVEL_OUTOF10: 7
PAINLEVEL_OUTOF10: 9
PAINLEVEL_OUTOF10: 2
PAINLEVEL_OUTOF10: 7
PAINLEVEL_OUTOF10: 7
PAINLEVEL_OUTOF10: 8
PAINLEVEL_OUTOF10: 4
PAINLEVEL_OUTOF10: 7
PAINLEVEL_OUTOF10: 8
PAINLEVEL_OUTOF10: 7

## 2022-05-23 ASSESSMENT — PAIN DESCRIPTION - LOCATION
LOCATION: ABDOMEN;BACK

## 2022-05-23 ASSESSMENT — PAIN DESCRIPTION - ONSET
ONSET: ON-GOING

## 2022-05-23 ASSESSMENT — PAIN DESCRIPTION - FREQUENCY
FREQUENCY: CONTINUOUS

## 2022-05-23 ASSESSMENT — PAIN DESCRIPTION - PAIN TYPE
TYPE: ACUTE PAIN

## 2022-05-23 ASSESSMENT — PAIN DESCRIPTION - DESCRIPTORS
DESCRIPTORS: ACHING;DISCOMFORT
DESCRIPTORS: ACHING;DISCOMFORT
DESCRIPTORS: ACHING;CRAMPING;DISCOMFORT
DESCRIPTORS: ACHING;DISCOMFORT

## 2022-05-23 ASSESSMENT — PAIN - FUNCTIONAL ASSESSMENT
PAIN_FUNCTIONAL_ASSESSMENT: ACTIVITIES ARE NOT PREVENTED
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
## 2022-05-23 NOTE — PROGRESS NOTES
No acute events overnight. VSS, c/o pain in abdomen. Medicated per mar.  Voiding adequately via urinal.

## 2022-05-23 NOTE — PROGRESS NOTES
Surgery POC Note      On interval AM rounds, patient complaining of \"excruciating\" abdominal pain. No external signs of injury and exam is non-peritoneal. Patient is hemodynamically stable without leukocytosis. Re-ordered lipase, LFTs, lactate. Will obtain CT with PO and IV contrast. Please delay scan 2 hours from taking in oral contrast.    RN to reach out to neurosurgery to discuss current symptoms, as patient was previously without intra-abdominal source of pain.       Terry Spencer DO  PGY3, General Surgery  05/23/22  10:24 AM  [phone]

## 2022-05-23 NOTE — PLAN OF CARE
Problem: Discharge Planning  Goal: Discharge to home or other facility with appropriate resources  Outcome: Progressing  Flowsheets (Taken 5/23/2022 1860)  Discharge to home or other facility with appropriate resources:   Identify barriers to discharge with patient and caregiver   Arrange for needed discharge resources and transportation as appropriate   Identify discharge learning needs (meds, wound care, etc)   Refer to discharge planning if patient needs post-hospital services based on physician order or complex needs related to functional status, cognitive ability or social support system     Problem: Pain  Goal: Verbalizes/displays adequate comfort level or baseline comfort level  Outcome: Progressing  Flowsheets  Taken 5/23/2022 1430  Verbalizes/displays adequate comfort level or baseline comfort level: Encourage patient to monitor pain and request assistance  Taken 5/23/2022 1100  Verbalizes/displays adequate comfort level or baseline comfort level:   Encourage patient to monitor pain and request assistance   Assess pain using appropriate pain scale   Administer analgesics based on type and severity of pain and evaluate response   Implement non-pharmacological measures as appropriate and evaluate response   Consider cultural and social influences on pain and pain management   Notify Licensed Independent Practitioner if interventions unsuccessful or patient reports new pain     Problem: Skin/Tissue Integrity  Goal: Absence of new skin breakdown  Description: 1. Monitor for areas of redness and/or skin breakdown  2. Assess vascular access sites hourly  3. Every 4-6 hours minimum:  Change oxygen saturation probe site  4. Every 4-6 hours:  If on nasal continuous positive airway pressure, respiratory therapy assess nares and determine need for appliance change or resting period.   Outcome: Progressing     Problem: Safety - Adult  Goal: Free from fall injury  Outcome: Progressing  Flowsheets (Taken 5/23/2022 5640)  Free From Fall Injury:   Instruct family/caregiver on patient safety   Based on caregiver fall risk screen, instruct family/caregiver to ask for assistance with transferring infant if caregiver noted to have fall risk factors     Problem: Neurosensory - Adult  Goal: Achieves maximal functionality and self care  Outcome: Progressing  Flowsheets (Taken 5/23/2022 0733)  Achieves maximal functionality and self care:   Monitor swallowing and airway patency with patient fatigue and changes in neurological status   Encourage and assist patient to increase activity and self care with guidance from physical therapy/occupational therapy   Encourage visually impaired, hearing impaired and aphasic patients to use assistive/communication devices     Problem: Cardiovascular - Adult  Goal: Maintains optimal cardiac output and hemodynamic stability  Outcome: Progressing  Flowsheets (Taken 5/23/2022 0733)  Maintains optimal cardiac output and hemodynamic stability:   Monitor blood pressure and heart rate   Monitor urine output and notify Licensed Independent Practitioner for values outside of normal range   Assess for signs of decreased cardiac output   Administer fluid and/or volume expanders as ordered   Administer vasoactive medications as ordered  Goal: Absence of cardiac dysrhythmias or at baseline  Outcome: Progressing  Flowsheets (Taken 5/23/2022 0733)  Absence of cardiac dysrhythmias or at baseline:   Monitor cardiac rate and rhythm   Administer antiarrhythmia medication and electrolyte replacement as ordered   Assess for signs of decreased cardiac output     Problem: Skin/Tissue Integrity - Adult  Goal: Skin integrity remains intact  Outcome: Progressing  Flowsheets  Taken 5/23/2022 0748  Skin Integrity Remains Intact:   Monitor for areas of redness and/or skin breakdown   Every 4-6 hours minimum: Change oxygen saturation probe site   Every 4-6 hours: If on nasal continuous positive airway pressure, respiratory therapy assesses nares and determine need for appliance change or resting period  Taken 5/23/2022 0733  Skin Integrity Remains Intact:   Monitor for areas of redness and/or skin breakdown   Every 4-6 hours minimum: Change oxygen saturation probe site   Every 4-6 hours: If on nasal continuous positive airway pressure, respiratory therapy assesses nares and determine need for appliance change or resting period  Goal: Incisions, wounds, or drain sites healing without S/S of infection  Outcome: Progressing  Flowsheets (Taken 5/23/2022 0733)  Incisions, Wounds, or Drain Sites Healing Without Sign and Symptoms of Infection:   ADMISSION and DAILY: Assess and document risk factors for pressure ulcer development   TWICE DAILY: Assess and document skin integrity   TWICE DAILY: Assess and document dressing/incision, wound bed, drain sites and surrounding tissue   Implement wound care per orders   Initiate isolation precautions as appropriate   Initiate pressure ulcer prevention bundle as indicated     Problem: Musculoskeletal - Adult  Goal: Maintain proper alignment of affected body part  Outcome: Progressing  Flowsheets (Taken 5/23/2022 0733)  Maintain proper alignment of affected body part:   Support and protect limb and body alignment per provider's orders   Instruct and reinforce with patient and family use of appropriate assistive device and precautions (e.g. spinal or hip dislocation precautions)     Problem: Gastrointestinal - Adult  Goal: Minimal or absence of nausea and vomiting  Outcome: Progressing  Flowsheets (Taken 5/23/2022 0733)  Minimal or absence of nausea and vomiting:   Administer IV fluids as ordered to ensure adequate hydration   Maintain NPO status until nausea and vomiting are resolved   Nasogastric tube to low intermittent suction as ordered   Administer ordered antiemetic medications as needed   Provide nonpharmacologic comfort measures as appropriate   Advance diet as tolerated, if ordered   Nutrition consult to assist patient with adequate nutrition and appropriate food choices  Goal: Maintains or returns to baseline bowel function  Outcome: Progressing  Flowsheets (Taken 5/23/2022 0733)  Maintains or returns to baseline bowel function:   Assess bowel function   Encourage oral fluids to ensure adequate hydration   Administer IV fluids as ordered to ensure adequate hydration   Administer ordered medications as needed   Encourage mobilization and activity   Nutrition consult to assist patient with appropriate food choices  Goal: Maintains adequate nutritional intake  Outcome: Progressing  Flowsheets (Taken 5/23/2022 0733)  Maintains adequate nutritional intake:   Monitor percentage of each meal consumed   Identify factors contributing to decreased intake, treat as appropriate   Assist with meals as needed   Monitor intake and output, weight and lab values   Obtain nutritional consult as needed     Problem: Genitourinary - Adult  Goal: Absence of urinary retention  Outcome: Progressing  Flowsheets (Taken 5/23/2022 0733)  Absence of urinary retention:   Assess patients ability to void and empty bladder   Monitor intake/output and perform bladder scan as needed   Place urinary catheter per Licensed Independent Practitioner order if needed   Discuss with Licensed Independent Practitioner  medications to alleviate retention as needed   Discuss catheterization for long term situations as appropriate     Problem: Infection - Adult  Goal: Absence of infection at discharge  Outcome: Progressing  Flowsheets (Taken 5/23/2022 0733)  Absence of infection at discharge:   Assess and monitor for signs and symptoms of infection   Monitor lab/diagnostic results   Monitor all insertion sites i.e., indwelling lines, tubes and drains   Monitor endotracheal (as able) and nasal secretions for changes in amount and color   [place] appropriate cooling/warming therapies per order   Administer medications as ordered   Instruct and encourage patient and family to use good hand hygiene technique   Identify and instruct in appropriate isolation precautions for identified infection/condition  Goal: Absence of infection during hospitalization  Outcome: Progressing  Flowsheets (Taken 5/23/2022 0733)  Absence of infection during hospitalization:   Assess and monitor for signs and symptoms of infection   Monitor lab/diagnostic results   Monitor all insertion sites i.e., indwelling lines, tubes and drains   Monitor endotracheal (as able) and nasal secretions for changes in amount and color   [place] appropriate cooling/warming therapies per order   Administer medications as ordered   Instruct and encourage patient and family to use good hand hygiene technique   Identify and instruct in appropriate isolation precautions for identified infection/condition     Problem: Metabolic/Fluid and Electrolytes - Adult  Goal: Electrolytes maintained within normal limits  Outcome: Progressing  Flowsheets (Taken 5/23/2022 0733)  Electrolytes maintained within normal limits:   Monitor labs and assess patient for signs and symptoms of electrolyte imbalances   Administer electrolyte replacement as ordered   Monitor response to electrolyte replacements, including repeat lab results as appropriate  Goal: Hemodynamic stability and optimal renal function maintained  Outcome: Progressing  Flowsheets (Taken 5/23/2022 0733)  Hemodynamic stability and optimal renal function maintained:   Monitor labs and assess for signs and symptoms of volume excess or deficit   Monitor intake, output and patient weight   Encourage oral intake as appropriate     Problem: Hematologic - Adult  Goal: Maintains hematologic stability  Outcome: Progressing  Flowsheets (Taken 5/23/2022 0733)  Maintains hematologic stability:   Assess for signs and symptoms of bleeding or hemorrhage   Monitor labs for bleeding or clotting disorders
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
## 2022-05-23 NOTE — PLAN OF CARE
Problem: Discharge Planning  Goal: Discharge to home or other facility with appropriate resources  5/23/2022 0002 by Agustina Grissom RN  Outcome: Progressing  5/22/2022 1634 by Neymar Devlin RN  Outcome: Progressing  Flowsheets  Taken 5/22/2022 0859 by Neymar Devlin RN  Discharge to home or other facility with appropriate resources:   Identify barriers to discharge with patient and caregiver   Arrange for needed discharge resources and transportation as appropriate   Identify discharge learning needs (meds, wound care, etc)   Refer to discharge planning if patient needs post-hospital services based on physician order or complex needs related to functional status, cognitive ability or social support system  Taken 5/22/2022 0417 by Vikash Garzon RN  Discharge to home or other facility with appropriate resources: Identify barriers to discharge with patient and caregiver     Problem: Pain  Goal: Verbalizes/displays adequate comfort level or baseline comfort level  5/23/2022 0002 by Agustina Grissom RN  Outcome: Progressing  Flowsheets (Taken 5/22/2022 1815 by Neymar Devlin RN)  Verbalizes/displays adequate comfort level or baseline comfort level:   Encourage patient to monitor pain and request assistance   Assess pain using appropriate pain scale   Administer analgesics based on type and severity of pain and evaluate response   Implement non-pharmacological measures as appropriate and evaluate response   Consider cultural and social influences on pain and pain management   Notify Licensed Independent Practitioner if interventions unsuccessful or patient reports new pain  5/22/2022 1634 by Neymar Devlin RN  Outcome: Progressing  Flowsheets  Taken 5/22/2022 1500  Verbalizes/displays adequate comfort level or baseline comfort level:   Encourage patient to monitor pain and request assistance   Assess pain using appropriate pain scale   Implement non-pharmacological measures as appropriate and evaluate response   Administer analgesics based on type and severity of pain and evaluate response   Consider cultural and social influences on pain and pain management   Notify Licensed Independent Practitioner if interventions unsuccessful or patient reports new pain  Taken 5/22/2022 1005  Verbalizes/displays adequate comfort level or baseline comfort level:   Encourage patient to monitor pain and request assistance   Assess pain using appropriate pain scale   Administer analgesics based on type and severity of pain and evaluate response   Implement non-pharmacological measures as appropriate and evaluate response   Consider cultural and social influences on pain and pain management   Notify Licensed Independent Practitioner if interventions unsuccessful or patient reports new pain     Problem: Skin/Tissue Integrity  Goal: Absence of new skin breakdown  Description: 1. Monitor for areas of redness and/or skin breakdown  2. Assess vascular access sites hourly  3. Every 4-6 hours minimum:  Change oxygen saturation probe site  4. Every 4-6 hours:  If on nasal continuous positive airway pressure, respiratory therapy assess nares and determine need for appliance change or resting period.   5/23/2022 0002 by Cesar Casanova RN  Outcome: Progressing  5/22/2022 1634 by Dean Higgins RN  Outcome: Progressing     Problem: Safety - Adult  Goal: Free from fall injury  5/23/2022 0002 by Cesar Casanova RN  Outcome: Progressing  5/22/2022 1634 by Dean Higgins RN  Outcome: Progressing  Flowsheets (Taken 5/22/2022 0903)  Free From Fall Injury:   John Ramirez family/caregiver on patient safety   Based on caregiver fall risk screen, instruct family/caregiver to ask for assistance with transferring infant if caregiver noted to have fall risk factors     Problem: Neurosensory - Adult  Goal: Achieves maximal functionality and self care  5/23/2022 0002 by Cesar Casanova RN  Outcome: Progressing  5/22/2022 1634 by Dean Higgins RN  Outcome: Progressing     Problem: Cardiovascular - Adult  Goal: Maintains optimal cardiac output and hemodynamic stability  5/23/2022 0002 by Cesar Casanova RN  Outcome: Progressing  5/22/2022 1634 by Dean Higgins RN  Outcome: Progressing  Goal: Absence of cardiac dysrhythmias or at baseline  5/23/2022 0002 by Cesar Casanova RN  Outcome: Progressing  5/22/2022 1634 by Dean Higgins RN  Outcome: Progressing     Problem: Skin/Tissue Integrity - Adult  Goal: Skin integrity remains intact  5/23/2022 0002 by Cesar Casanova RN  Outcome: Progressing  5/22/2022 1634 by Dean Higgins RN  Outcome: Progressing  Flowsheets  Taken 5/22/2022 0903  Skin Integrity Remains Intact:   Monitor for areas of redness and/or skin breakdown   Every 4-6 hours minimum: Change oxygen saturation probe site   Every 4-6 hours: If on nasal continuous positive airway pressure, respiratory therapy assesses nares and determine need for appliance change or resting period  Taken 5/22/2022 0859  Skin Integrity Remains Intact:   Monitor for areas of redness and/or skin breakdown   Every 4-6 hours minimum: Change oxygen saturation probe site   Every 4-6 hours: If on nasal continuous positive airway pressure, respiratory therapy assesses nares and determine need for appliance change or resting period  Goal: Incisions, wounds, or drain sites healing without S/S of infection  5/23/2022 0002 by Cesar Casanova RN  Outcome: Progressing  5/22/2022 1634 by Dean Higgins RN  Outcome: Progressing     Problem: Musculoskeletal - Adult  Goal: Maintain proper alignment of affected body part  5/22/2022 1634 by Dean Higgins RN  Outcome: Progressing     Problem: Gastrointestinal - Adult  Goal: Minimal or absence of nausea and vomiting  5/22/2022 1634 by Dean Higgins RN  Outcome: Progressing  Goal: Maintains or returns to baseline bowel function  5/22/2022 1634 by Dean Higgins RN  Outcome: Progressing  Goal: Maintains adequate nutritional intake  5/22/2022 1634 by Jonathan Ayala RN  Outcome: Progressing     Problem: Genitourinary - Adult  Goal: Absence of urinary retention  5/22/2022 1634 by Jonathan Ayala RN  Outcome: Progressing     Problem: Infection - Adult  Goal: Absence of infection at discharge  5/22/2022 1634 by Jonathan Ayala RN  Outcome: Progressing  Goal: Absence of infection during hospitalization  5/22/2022 1634 by Jonathan Ayala RN  Outcome: Progressing     Problem: Metabolic/Fluid and Electrolytes - Adult  Goal: Electrolytes maintained within normal limits  5/22/2022 1634 by Jonathan Ayala RN  Outcome: Progressing  Goal: Hemodynamic stability and optimal renal function maintained  5/22/2022 1634 by Jonathan Ayala RN  Outcome: Progressing     Problem: Hematologic - Adult  Goal: Maintains hematologic stability  5/22/2022 1634 by Jonathan Ayala RN  Outcome: Progressing
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
## 2022-05-23 NOTE — PROGRESS NOTES
Interval Note  General Surgery    Serial Abdominal Exam  Patient seen and evaluated at bedside as part of plan for serial abdominal exams. Patient states abdominal pain has remained the same, but states he would describe it as crampy rather than painful. Abdomen is soft, nontender, non-distended, and without guarding or rigidity. Patient has been tolerating CLD without nausea or vomiting. Patient denies any increased pain or cramping when drinking. Will continue to monitor.     Alexa Jacobson DO  PGY-1, General Surgery  05/23/22  4:05 AM  [phone]

## 2022-05-24 VITALS
HEIGHT: 66 IN | BODY MASS INDEX: 26.52 KG/M2 | WEIGHT: 165 LBS | RESPIRATION RATE: 18 BRPM | DIASTOLIC BLOOD PRESSURE: 89 MMHG | OXYGEN SATURATION: 96 % | TEMPERATURE: 98.1 F | HEART RATE: 51 BPM | SYSTOLIC BLOOD PRESSURE: 147 MMHG

## 2022-05-24 PROBLEM — T79.6XXA TRAUMATIC RHABDOMYOLYSIS (HCC): Status: ACTIVE | Noted: 2022-05-24

## 2022-05-24 PROBLEM — S22.000A THORACIC COMPRESSION FRACTURE (HCC): Status: ACTIVE | Noted: 2022-05-21

## 2022-05-24 LAB
ALBUMIN SERPL-MCNC: 3.8 G/DL (ref 3.4–5)
ALP BLD-CCNC: 45 U/L (ref 40–129)
ALT SERPL-CCNC: 12 U/L (ref 10–40)
ANION GAP SERPL CALCULATED.3IONS-SCNC: 12 MMOL/L (ref 3–16)
AST SERPL-CCNC: 21 U/L (ref 15–37)
BASOPHILS ABSOLUTE: 0.1 K/UL (ref 0–0.2)
BASOPHILS RELATIVE PERCENT: 1.4 %
BILIRUB SERPL-MCNC: 1 MG/DL (ref 0–1)
BILIRUBIN DIRECT: <0.2 MG/DL (ref 0–0.3)
BILIRUBIN, INDIRECT: ABNORMAL MG/DL (ref 0–1)
BUN BLDV-MCNC: 6 MG/DL (ref 7–20)
CALCIUM SERPL-MCNC: 9.1 MG/DL (ref 8.3–10.6)
CHLORIDE BLD-SCNC: 103 MMOL/L (ref 99–110)
CO2: 24 MMOL/L (ref 21–32)
CREAT SERPL-MCNC: 0.9 MG/DL (ref 0.9–1.3)
EOSINOPHILS ABSOLUTE: 0.1 K/UL (ref 0–0.6)
EOSINOPHILS RELATIVE PERCENT: 1.6 %
GFR AFRICAN AMERICAN: >60
GFR NON-AFRICAN AMERICAN: >60
GLUCOSE BLD-MCNC: 92 MG/DL (ref 70–99)
HCT VFR BLD CALC: 39.2 % (ref 40.5–52.5)
HEMOGLOBIN: 13.4 G/DL (ref 13.5–17.5)
LYMPHOCYTES ABSOLUTE: 1.6 K/UL (ref 1–5.1)
LYMPHOCYTES RELATIVE PERCENT: 29.9 %
MAGNESIUM: 1.9 MG/DL (ref 1.8–2.4)
MCH RBC QN AUTO: 31.3 PG (ref 26–34)
MCHC RBC AUTO-ENTMCNC: 34.1 G/DL (ref 31–36)
MCV RBC AUTO: 91.9 FL (ref 80–100)
MONOCYTES ABSOLUTE: 0.5 K/UL (ref 0–1.3)
MONOCYTES RELATIVE PERCENT: 9.8 %
NEUTROPHILS ABSOLUTE: 3 K/UL (ref 1.7–7.7)
NEUTROPHILS RELATIVE PERCENT: 57.3 %
PDW BLD-RTO: 13 % (ref 12.4–15.4)
PHOSPHORUS: 3.6 MG/DL (ref 2.5–4.9)
PLATELET # BLD: 182 K/UL (ref 135–450)
PMV BLD AUTO: 8.2 FL (ref 5–10.5)
POTASSIUM SERPL-SCNC: 3.9 MMOL/L (ref 3.5–5.1)
RBC # BLD: 4.27 M/UL (ref 4.2–5.9)
SODIUM BLD-SCNC: 139 MMOL/L (ref 136–145)
TOTAL CK: 330 U/L (ref 39–308)
TOTAL PROTEIN: 6.3 G/DL (ref 6.4–8.2)
WBC # BLD: 5.2 K/UL (ref 4–11)

## 2022-05-24 PROCEDURE — 36415 COLL VENOUS BLD VENIPUNCTURE: CPT

## 2022-05-24 PROCEDURE — 2580000003 HC RX 258: Performed by: INTERNAL MEDICINE

## 2022-05-24 PROCEDURE — 85025 COMPLETE CBC W/AUTO DIFF WBC: CPT

## 2022-05-24 PROCEDURE — 83735 ASSAY OF MAGNESIUM: CPT

## 2022-05-24 PROCEDURE — 80069 RENAL FUNCTION PANEL: CPT

## 2022-05-24 PROCEDURE — 82550 ASSAY OF CK (CPK): CPT

## 2022-05-24 PROCEDURE — 80076 HEPATIC FUNCTION PANEL: CPT

## 2022-05-24 PROCEDURE — 6370000000 HC RX 637 (ALT 250 FOR IP): Performed by: INTERNAL MEDICINE

## 2022-05-24 PROCEDURE — 6370000000 HC RX 637 (ALT 250 FOR IP): Performed by: NURSE PRACTITIONER

## 2022-05-24 PROCEDURE — 6360000002 HC RX W HCPCS: Performed by: INTERNAL MEDICINE

## 2022-05-24 RX ORDER — OXYCODONE HYDROCHLORIDE 5 MG/1
TABLET ORAL
Qty: 30 TABLET | Refills: 0 | Status: SHIPPED | OUTPATIENT
Start: 2022-05-24 | End: 2022-05-31

## 2022-05-24 RX ORDER — GINSENG 100 MG
CAPSULE ORAL
Qty: 1 EACH | Refills: 0 | Status: SHIPPED | OUTPATIENT
Start: 2022-05-24 | End: 2022-06-03

## 2022-05-24 RX ORDER — TIZANIDINE 4 MG/1
4 TABLET ORAL 3 TIMES DAILY
Status: DISCONTINUED | OUTPATIENT
Start: 2022-05-24 | End: 2022-05-24 | Stop reason: HOSPADM

## 2022-05-24 RX ORDER — SENNA AND DOCUSATE SODIUM 50; 8.6 MG/1; MG/1
2 TABLET, FILM COATED ORAL 2 TIMES DAILY
Qty: 60 TABLET | Refills: 0 | Status: SHIPPED | OUTPATIENT
Start: 2022-05-24

## 2022-05-24 RX ORDER — TIZANIDINE 4 MG/1
4 TABLET ORAL 3 TIMES DAILY
Qty: 30 TABLET | Refills: 0 | Status: SHIPPED | OUTPATIENT
Start: 2022-05-24 | End: 2022-06-03

## 2022-05-24 RX ORDER — GABAPENTIN 100 MG/1
200 CAPSULE ORAL 3 TIMES DAILY
Status: DISCONTINUED | OUTPATIENT
Start: 2022-05-24 | End: 2022-05-24 | Stop reason: HOSPADM

## 2022-05-24 RX ORDER — POLYETHYLENE GLYCOL 3350 17 G/17G
17 POWDER, FOR SOLUTION ORAL DAILY
Qty: 527 G | Refills: 1 | COMMUNITY
Start: 2022-05-25 | End: 2022-06-24

## 2022-05-24 RX ORDER — GABAPENTIN 100 MG/1
200 CAPSULE ORAL 3 TIMES DAILY
Qty: 180 CAPSULE | Refills: 0 | Status: SHIPPED | OUTPATIENT
Start: 2022-05-24 | End: 2022-06-23

## 2022-05-24 RX ORDER — SENNA AND DOCUSATE SODIUM 50; 8.6 MG/1; MG/1
2 TABLET, FILM COATED ORAL 2 TIMES DAILY
COMMUNITY
Start: 2022-05-24 | End: 2022-05-24

## 2022-05-24 RX ADMIN — HYDROMORPHONE HYDROCHLORIDE 1 MG: 1 INJECTION, SOLUTION INTRAMUSCULAR; INTRAVENOUS; SUBCUTANEOUS at 05:29

## 2022-05-24 RX ADMIN — OXYCODONE 10 MG: 5 TABLET ORAL at 04:26

## 2022-05-24 RX ADMIN — BACITRACIN: 500 OINTMENT TOPICAL at 08:33

## 2022-05-24 RX ADMIN — SODIUM CHLORIDE, PRESERVATIVE FREE 10 ML: 5 INJECTION INTRAVENOUS at 08:33

## 2022-05-24 RX ADMIN — METHOCARBAMOL 750 MG: 100 INJECTION, SOLUTION INTRAMUSCULAR; INTRAVENOUS at 08:36

## 2022-05-24 RX ADMIN — TIZANIDINE 4 MG: 4 TABLET ORAL at 12:47

## 2022-05-24 RX ADMIN — OXYCODONE 10 MG: 5 TABLET ORAL at 08:32

## 2022-05-24 RX ADMIN — HYDROMORPHONE HYDROCHLORIDE 1 MG: 1 INJECTION, SOLUTION INTRAMUSCULAR; INTRAVENOUS; SUBCUTANEOUS at 10:57

## 2022-05-24 RX ADMIN — METHOCARBAMOL 750 MG: 100 INJECTION, SOLUTION INTRAMUSCULAR; INTRAVENOUS at 03:05

## 2022-05-24 RX ADMIN — SENNOSIDES AND DOCUSATE SODIUM 2 TABLET: 50; 8.6 TABLET ORAL at 08:32

## 2022-05-24 RX ADMIN — GABAPENTIN 200 MG: 100 CAPSULE ORAL at 12:47

## 2022-05-24 ASSESSMENT — PAIN SCALES - GENERAL
PAINLEVEL_OUTOF10: 7
PAINLEVEL_OUTOF10: 8
PAINLEVEL_OUTOF10: 4
PAINLEVEL_OUTOF10: 7
PAINLEVEL_OUTOF10: 7

## 2022-05-24 ASSESSMENT — PAIN DESCRIPTION - ONSET: ONSET: ON-GOING

## 2022-05-24 ASSESSMENT — PAIN - FUNCTIONAL ASSESSMENT: PAIN_FUNCTIONAL_ASSESSMENT: ACTIVITIES ARE NOT PREVENTED

## 2022-05-24 ASSESSMENT — PAIN DESCRIPTION - PAIN TYPE: TYPE: ACUTE PAIN

## 2022-05-24 ASSESSMENT — PAIN DESCRIPTION - LOCATION: LOCATION: ABDOMEN

## 2022-05-24 ASSESSMENT — PAIN DESCRIPTION - DESCRIPTORS: DESCRIPTORS: ACHING;CRAMPING

## 2022-05-24 ASSESSMENT — PAIN DESCRIPTION - FREQUENCY: FREQUENCY: CONTINUOUS
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
## 2022-05-24 NOTE — PLAN OF CARE
Problem: Discharge Planning  Goal: Discharge to home or other facility with appropriate resources  Outcome: Completed  Flowsheets (Taken 5/24/2022 3967)  Discharge to home or other facility with appropriate resources:   Identify barriers to discharge with patient and caregiver   Arrange for needed discharge resources and transportation as appropriate   Identify discharge learning needs (meds, wound care, etc)   Refer to discharge planning if patient needs post-hospital services based on physician order or complex needs related to functional status, cognitive ability or social support system     Problem: Pain  Goal: Verbalizes/displays adequate comfort level or baseline comfort level  Outcome: Completed  Flowsheets (Taken 5/24/2022 1045)  Verbalizes/displays adequate comfort level or baseline comfort level:   Encourage patient to monitor pain and request assistance   Assess pain using appropriate pain scale   Administer analgesics based on type and severity of pain and evaluate response   Implement non-pharmacological measures as appropriate and evaluate response   Consider cultural and social influences on pain and pain management   Notify Licensed Independent Practitioner if interventions unsuccessful or patient reports new pain     Problem: Skin/Tissue Integrity  Goal: Absence of new skin breakdown  Description: 1. Monitor for areas of redness and/or skin breakdown  2. Assess vascular access sites hourly  3. Every 4-6 hours minimum:  Change oxygen saturation probe site  4. Every 4-6 hours:  If on nasal continuous positive airway pressure, respiratory therapy assess nares and determine need for appliance change or resting period.   5/24/2022 1146 by Ronel Sosa RN  Outcome: Completed  5/24/2022 0027 by Hubert Stanton RN  Outcome: Progressing     Problem: Safety - Adult  Goal: Free from fall injury  5/24/2022 1146 by Ronel Sosa RN  Outcome: Completed  Flowsheets (Taken 5/24/2022 0849)  Free From Fall Injury:   Instruct family/caregiver on patient safety   Based on caregiver fall risk screen, instruct family/caregiver to ask for assistance with transferring infant if caregiver noted to have fall risk factors  5/24/2022 0027 by Ann-Marie Giron RN  Outcome: Progressing     Problem: Neurosensory - Adult  Goal: Achieves maximal functionality and self care  5/24/2022 1146 by Fifi Jones RN  Outcome: Completed  Flowsheets (Taken 5/24/2022 0843)  Achieves maximal functionality and self care:   Monitor swallowing and airway patency with patient fatigue and changes in neurological status   Encourage and assist patient to increase activity and self care with guidance from physical therapy/occupational therapy   Encourage visually impaired, hearing impaired and aphasic patients to use assistive/communication devices  5/24/2022 0027 by Ann-Marie Giron RN  Outcome: Progressing     Problem: Cardiovascular - Adult  Goal: Maintains optimal cardiac output and hemodynamic stability  Outcome: Completed  Flowsheets (Taken 5/24/2022 0843)  Maintains optimal cardiac output and hemodynamic stability:   Monitor blood pressure and heart rate   Monitor urine output and notify Licensed Independent Practitioner for values outside of normal range   Assess for signs of decreased cardiac output   Administer fluid and/or volume expanders as ordered  Goal: Absence of cardiac dysrhythmias or at baseline  Outcome: Completed  Flowsheets (Taken 5/24/2022 0843)  Absence of cardiac dysrhythmias or at baseline:   Monitor cardiac rate and rhythm   Assess for signs of decreased cardiac output   Administer antiarrhythmia medication and electrolyte replacement as ordered     Problem: Skin/Tissue Integrity - Adult  Goal: Skin integrity remains intact  5/24/2022 1146 by Fifi Jones RN  Outcome: Completed  Flowsheets (Taken 5/24/2022 0843)  Skin Integrity Remains Intact:   Monitor for areas of redness and/or skin breakdown   Every 4-6 hours minimum: Change oxygen saturation probe site   Every 4-6 hours: If on nasal continuous positive airway pressure, respiratory therapy assesses nares and determine need for appliance change or resting period  5/24/2022 0027 by Robert Love RN  Outcome: Progressing  Goal: Incisions, wounds, or drain sites healing without S/S of infection  5/24/2022 1146 by Keily Bacon RN  Outcome: Completed  Flowsheets (Taken 5/24/2022 0843)  Incisions, Wounds, or Drain Sites Healing Without Sign and Symptoms of Infection:   ADMISSION and DAILY: Assess and document risk factors for pressure ulcer development   TWICE DAILY: Assess and document skin integrity   TWICE DAILY: Assess and document dressing/incision, wound bed, drain sites and surrounding tissue   Implement wound care per orders  5/24/2022 0027 by Robert Love RN  Outcome: Progressing     Problem: Musculoskeletal - Adult  Goal: Maintain proper alignment of affected body part  Outcome: Completed  Flowsheets (Taken 5/24/2022 0843)  Maintain proper alignment of affected body part:   Support and protect limb and body alignment per provider's orders   Instruct and reinforce with patient and family use of appropriate assistive device and precautions (e.g. spinal or hip dislocation precautions)     Problem: Gastrointestinal - Adult  Goal: Minimal or absence of nausea and vomiting  Outcome: Completed  Flowsheets (Taken 5/24/2022 0843)  Minimal or absence of nausea and vomiting:   Administer IV fluids as ordered to ensure adequate hydration   Provide nonpharmacologic comfort measures as appropriate  Goal: Maintains or returns to baseline bowel function  Outcome: Completed  Flowsheets (Taken 5/24/2022 0843)  Maintains or returns to baseline bowel function:   Assess bowel function   Encourage oral fluids to ensure adequate hydration   Administer IV fluids as ordered to ensure adequate hydration   Administer ordered medications as needed   Encourage mobilization and activity Nutrition consult to assist patient with appropriate food choices  Goal: Maintains adequate nutritional intake  Outcome: Completed  Flowsheets (Taken 5/24/2022 0843)  Maintains adequate nutritional intake:   Monitor percentage of each meal consumed   Identify factors contributing to decreased intake, treat as appropriate   Assist with meals as needed   Monitor intake and output, weight and lab values   Obtain nutritional consult as needed     Problem: Genitourinary - Adult  Goal: Absence of urinary retention  Outcome: Completed  Flowsheets (Taken 5/24/2022 0843)  Absence of urinary retention:   Assess patients ability to void and empty bladder   Monitor intake/output and perform bladder scan as needed     Problem: Infection - Adult  Goal: Absence of infection at discharge  Outcome: Completed  Flowsheets (Taken 5/24/2022 0843)  Absence of infection at discharge:   Assess and monitor for signs and symptoms of infection   Monitor lab/diagnostic results   Monitor all insertion sites i.e., indwelling lines, tubes and drains   [place] appropriate cooling/warming therapies per order   Administer medications as ordered   Instruct and encourage patient and family to use good hand hygiene technique   Identify and instruct in appropriate isolation precautions for identified infection/condition  Goal: Absence of infection during hospitalization  Outcome: Completed  Flowsheets (Taken 5/24/2022 0843)  Absence of infection during hospitalization:   Assess and monitor for signs and symptoms of infection   Monitor lab/diagnostic results   Monitor all insertion sites i.e., indwelling lines, tubes and drains   [place] appropriate cooling/warming therapies per order   Administer medications as ordered   Instruct and encourage patient and family to use good hand hygiene technique   Identify and instruct in appropriate isolation precautions for identified infection/condition     Problem: Metabolic/Fluid and Electrolytes - Adult  Goal: Electrolytes maintained within normal limits  Outcome: Completed  Flowsheets (Taken 5/24/2022 0843)  Electrolytes maintained within normal limits:   Monitor labs and assess patient for signs and symptoms of electrolyte imbalances   Administer electrolyte replacement as ordered   Monitor response to electrolyte replacements, including repeat lab results as appropriate   Instruct patient on fluid and nutrition restrictions as appropriate  Goal: Hemodynamic stability and optimal renal function maintained  Outcome: Completed  Flowsheets (Taken 5/24/2022 0843)  Hemodynamic stability and optimal renal function maintained:   Monitor labs and assess for signs and symptoms of volume excess or deficit   Monitor intake, output and patient weight   Monitor response to interventions for patient's volume status, including labs, urine output, blood pressure (other measures as available)   Encourage oral intake as appropriate     Problem: Hematologic - Adult  Goal: Maintains hematologic stability  Outcome: Completed  Flowsheets (Taken 5/24/2022 0843)  Maintains hematologic stability:   Assess for signs and symptoms of bleeding or hemorrhage   Monitor labs for bleeding or clotting disorders

## 2022-05-24 NOTE — PLAN OF CARE
Problem: Discharge Planning  Goal: Discharge to home or other facility with appropriate resources  5/23/2022 1635 by Alec Garcia RN  Outcome: Progressing  Flowsheets (Taken 5/23/2022 6700)  Discharge to home or other facility with appropriate resources:   Identify barriers to discharge with patient and caregiver   Arrange for needed discharge resources and transportation as appropriate   Identify discharge learning needs (meds, wound care, etc)   Refer to discharge planning if patient needs post-hospital services based on physician order or complex needs related to functional status, cognitive ability or social support system     Problem: Pain  Goal: Verbalizes/displays adequate comfort level or baseline comfort level  Recent Flowsheet Documentation  Taken 5/23/2022 1815 by Alec Garcia RN  Verbalizes/displays adequate comfort level or baseline comfort level:   Encourage patient to monitor pain and request assistance   Assess pain using appropriate pain scale   Administer analgesics based on type and severity of pain and evaluate response   Implement non-pharmacological measures as appropriate and evaluate response   Consider cultural and social influences on pain and pain management   Notify Licensed Independent Practitioner if interventions unsuccessful or patient reports new pain  5/23/2022 1635 by Alec Garcia RN  Outcome: Progressing  Flowsheets  Taken 5/23/2022 1430  Verbalizes/displays adequate comfort level or baseline comfort level: Encourage patient to monitor pain and request assistance  Taken 5/23/2022 1100  Verbalizes/displays adequate comfort level or baseline comfort level:   Encourage patient to monitor pain and request assistance   Assess pain using appropriate pain scale   Administer analgesics based on type and severity of pain and evaluate response   Implement non-pharmacological measures as appropriate and evaluate response   Consider cultural and social influences on pain and pain management   Notify Licensed Independent Practitioner if interventions unsuccessful or patient reports new pain     Problem: Skin/Tissue Integrity  Goal: Absence of new skin breakdown  Description: 1. Monitor for areas of redness and/or skin breakdown  2. Assess vascular access sites hourly  3. Every 4-6 hours minimum:  Change oxygen saturation probe site  4. Every 4-6 hours:  If on nasal continuous positive airway pressure, respiratory therapy assess nares and determine need for appliance change or resting period.   5/24/2022 0027 by Rosalina Christian RN  Outcome: Progressing  5/23/2022 1635 by Dung Boudreaux RN  Outcome: Progressing     Problem: Safety - Adult  Goal: Free from fall injury  5/24/2022 0027 by Rosalina Christian RN  Outcome: Progressing  5/23/2022 1635 by Dung Boudreaux RN  Outcome: Progressing  Flowsheets (Taken 5/23/2022 0748)  Free From Fall Injury:   Yovani Srivastava family/caregiver on patient safety   Based on caregiver fall risk screen, instruct family/caregiver to ask for assistance with transferring infant if caregiver noted to have fall risk factors     Problem: Neurosensory - Adult  Goal: Achieves maximal functionality and self care  5/24/2022 0027 by Rosalina Christian RN  Outcome: Progressing  5/23/2022 1635 by Dung Boudreaux RN  Outcome: Progressing  Flowsheets (Taken 5/23/2022 0733)  Achieves maximal functionality and self care:   Monitor swallowing and airway patency with patient fatigue and changes in neurological status   Encourage and assist patient to increase activity and self care with guidance from physical therapy/occupational therapy   Encourage visually impaired, hearing impaired and aphasic patients to use assistive/communication devices     Problem: Cardiovascular - Adult  Goal: Maintains optimal cardiac output and hemodynamic stability  5/23/2022 1635 by Dung Boudreaux RN  Outcome: Progressing  Flowsheets (Taken 5/23/2022 0733)  Maintains optimal cardiac output and hemodynamic stability:   Monitor blood pressure and heart rate   Monitor urine output and notify Licensed Independent Practitioner for values outside of normal range   Assess for signs of decreased cardiac output   Administer fluid and/or volume expanders as ordered   Administer vasoactive medications as ordered  Goal: Absence of cardiac dysrhythmias or at baseline  5/23/2022 1635 by Angelito Schwartz RN  Outcome: Progressing  Flowsheets (Taken 5/23/2022 0733)  Absence of cardiac dysrhythmias or at baseline:   Monitor cardiac rate and rhythm   Administer antiarrhythmia medication and electrolyte replacement as ordered   Assess for signs of decreased cardiac output     Problem: Skin/Tissue Integrity - Adult  Goal: Skin integrity remains intact  5/24/2022 0027 by Melinda Cristina RN  Outcome: Progressing  5/23/2022 1635 by Angelito Schwartz RN  Outcome: Progressing  Flowsheets  Taken 5/23/2022 0748  Skin Integrity Remains Intact:   Monitor for areas of redness and/or skin breakdown   Every 4-6 hours minimum: Change oxygen saturation probe site   Every 4-6 hours: If on nasal continuous positive airway pressure, respiratory therapy assesses nares and determine need for appliance change or resting period  Taken 5/23/2022 0733  Skin Integrity Remains Intact:   Monitor for areas of redness and/or skin breakdown   Every 4-6 hours minimum: Change oxygen saturation probe site   Every 4-6 hours: If on nasal continuous positive airway pressure, respiratory therapy assesses nares and determine need for appliance change or resting period  Goal: Incisions, wounds, or drain sites healing without S/S of infection  5/24/2022 0027 by Melinda Cristina RN  Outcome: Progressing  5/23/2022 1635 by Angelito Schwartz RN  Outcome: Progressing  Flowsheets (Taken 5/23/2022 0733)  Incisions, Wounds, or Drain Sites Healing Without Sign and Symptoms of Infection:   ADMISSION and DAILY: Assess and document risk factors for pressure ulcer development TWICE DAILY: Assess and document skin integrity   TWICE DAILY: Assess and document dressing/incision, wound bed, drain sites and surrounding tissue   Implement wound care per orders   Initiate isolation precautions as appropriate   Initiate pressure ulcer prevention bundle as indicated     Problem: Musculoskeletal - Adult  Goal: Maintain proper alignment of affected body part  Recent Flowsheet Documentation  Taken 5/23/2022 1930 by Garry Velasquez RN  Maintain proper alignment of affected body part: Support and protect limb and body alignment per provider's orders  5/23/2022 1635 by Patricio Espitia RN  Outcome: Progressing  Flowsheets (Taken 5/23/2022 0733)  Maintain proper alignment of affected body part:   Support and protect limb and body alignment per provider's orders   Instruct and reinforce with patient and family use of appropriate assistive device and precautions (e.g. spinal or hip dislocation precautions)     Problem: Gastrointestinal - Adult  Goal: Minimal or absence of nausea and vomiting  5/23/2022 1635 by Patricio Espitia RN  Outcome: Progressing  Flowsheets (Taken 5/23/2022 0733)  Minimal or absence of nausea and vomiting:   Administer IV fluids as ordered to ensure adequate hydration   Maintain NPO status until nausea and vomiting are resolved   Nasogastric tube to low intermittent suction as ordered   Administer ordered antiemetic medications as needed   Provide nonpharmacologic comfort measures as appropriate   Advance diet as tolerated, if ordered   Nutrition consult to assist patient with adequate nutrition and appropriate food choices  Goal: Maintains or returns to baseline bowel function  5/23/2022 1635 by Patricio Espitia RN  Outcome: Progressing  Flowsheets (Taken 5/23/2022 0733)  Maintains or returns to baseline bowel function:   Assess bowel function   Encourage oral fluids to ensure adequate hydration   Administer IV fluids as ordered to ensure adequate hydration   Administer ordered medications as needed   Encourage mobilization and activity   Nutrition consult to assist patient with appropriate food choices  Goal: Maintains adequate nutritional intake  5/23/2022 1635 by Angelito Schwartz RN  Outcome: Progressing  Flowsheets (Taken 5/23/2022 0733)  Maintains adequate nutritional intake:   Monitor percentage of each meal consumed   Identify factors contributing to decreased intake, treat as appropriate   Assist with meals as needed   Monitor intake and output, weight and lab values   Obtain nutritional consult as needed     Problem: Genitourinary - Adult  Goal: Absence of urinary retention  5/23/2022 1635 by Angelito Schwartz RN  Outcome: Progressing  Flowsheets (Taken 5/23/2022 0733)  Absence of urinary retention:   Assess patients ability to void and empty bladder   Monitor intake/output and perform bladder scan as needed   Place urinary catheter per Licensed Independent Practitioner order if needed   Discuss with Licensed Independent Practitioner  medications to alleviate retention as needed   Discuss catheterization for long term situations as appropriate     Problem: Infection - Adult  Goal: Absence of infection at discharge  5/23/2022 1635 by Angelito Schwartz RN  Outcome: Progressing  Flowsheets (Taken 5/23/2022 0733)  Absence of infection at discharge:   Assess and monitor for signs and symptoms of infection   Monitor lab/diagnostic results   Monitor all insertion sites i.e., indwelling lines, tubes and drains   Monitor endotracheal (as able) and nasal secretions for changes in amount and color   [place] appropriate cooling/warming therapies per order   Administer medications as ordered   Instruct and encourage patient and family to use good hand hygiene technique   Identify and instruct in appropriate isolation precautions for identified infection/condition  Goal: Absence of infection during hospitalization  5/23/2022 1635 by Angelito Schwarzt RN  Outcome: Progressing  Flowsheets (Taken 5/23/2022 9736)  Absence of infection during hospitalization:   Assess and monitor for signs and symptoms of infection   Monitor lab/diagnostic results   Monitor all insertion sites i.e., indwelling lines, tubes and drains   Monitor endotracheal (as able) and nasal secretions for changes in amount and color   [place] appropriate cooling/warming therapies per order   Administer medications as ordered   Instruct and encourage patient and family to use good hand hygiene technique   Identify and instruct in appropriate isolation precautions for identified infection/condition     Problem: Metabolic/Fluid and Electrolytes - Adult  Goal: Electrolytes maintained within normal limits  5/23/2022 1635 by Neymar Devlin RN  Outcome: Progressing  Flowsheets (Taken 5/23/2022 0733)  Electrolytes maintained within normal limits:   Monitor labs and assess patient for signs and symptoms of electrolyte imbalances   Administer electrolyte replacement as ordered   Monitor response to electrolyte replacements, including repeat lab results as appropriate  Goal: Hemodynamic stability and optimal renal function maintained  5/23/2022 1635 by Neymar Devlin RN  Outcome: Progressing  Flowsheets (Taken 5/23/2022 0733)  Hemodynamic stability and optimal renal function maintained:   Monitor labs and assess for signs and symptoms of volume excess or deficit   Monitor intake, output and patient weight   Encourage oral intake as appropriate     Problem: Hematologic - Adult  Goal: Maintains hematologic stability  5/23/2022 1635 by Neymar Devlin RN  Outcome: Progressing  Flowsheets (Taken 5/23/2022 0733)  Maintains hematologic stability:   Assess for signs and symptoms of bleeding or hemorrhage   Monitor labs for bleeding or clotting disorders

## 2022-05-24 NOTE — PROGRESS NOTES
NEUROSURGERY PROGRESS NOTE    5/24/2022 11:48 AM                               Tad Mcclendon                      LOS: 3 days     Subjective: Patient sitting up in bed upon entering the room. No acute events overnight. Patient has no specific complaints morning. Physical Exam:  Patient seen and examined    Vitals:    05/24/22 1045   BP: (!) 147/89   Pulse: 51   Resp: 18   Temp: 98.1 °F (36.7 °C)   SpO2: 96%     GCS:  4 - Opens eyes on own  5 - Alert and oriented  6 - Follows simple motor commands  General: Well developed. Alert and cooperative in no acute distress. HENT: atraumatic, neck supple  Eyes: Optic discs: Not tested  Pulmonary: unlabored respiratory effort  Cardiovascular:  Warm well perfused. No peripheral edema  Gastrointestinal: abdomen soft, NT, ND    Neurological:  Mental Status: Awake, alert, oriented x 4, speech clear and appropriate  Attention: Intact  Language: No aphasia or dysarthria noted  Sensation: Intact to all extremities to light touch  Coordination: Intact    Musculoskeletal:   Gait: Not tested   Assist devices: None   Tone: Normal  Motor strength:    Right  Left    Right  Left    Deltoid  5 5  Hip Flex  5 5   Biceps  5 5  Knee Extensors  5 5   Triceps  5 5  Knee Flexors  5 5   Wrist Ext  5 5  Ankle Dorsiflex. 5 5   Wrist Flex  5 5  Ankle Plantarflex. 5 5   Handgrip  5 5  Ext Lester Longus  5 5   Thumb Ext  5 5         Radiological Findings:  Dr. Erika Woods personally reviewed the CT T-spine which demonstrates T7 burst fracture with retropulsion and associated hemorrhage. MRI reveals similar fracture but no spinal cord compression. Upright x-rays in brace obtained and demonstrated stability of fracture when upright and in brace.     Labs:  Recent Labs     05/24/22  0448   WBC 5.2   HGB 13.4*   HCT 39.2*          Recent Labs     05/24/22  0449      K 3.9      CO2 24   BUN 6*   CREATININE 0.9   GLUCOSE 92   CALCIUM 9.1   PHOS 3.6   MG 1.90       Recent Labs 05/21/22 2054   PROTIME 12.2   INR 1.08   APTT 28.2       Patient Active Problem List    Diagnosis Date Noted    Traumatic rhabdomyolysis ([facility] [address] 05/24/2022    Closed stable burst fracture of T7 vertebra ([facility]) 05/21/2022    Seasonal allergic rhinitis due to pollen 01/22/2021    Internal hemorrhoids without complication 08/35/3582    Gastroesophageal reflux disease 01/22/2021       Assessment:  Patient is a 42 y.o. male  with T7 burst fracture deemed to be stable in brace. Patient experiencing peripheral nerve pain consistent with the T7 dermatome. Plan:  1. Neurologic exams frequency: Q4H  2. For change in exam MUST contact neurosurgery team along with critical care or primary team  3. T7 Fracture:  - Brace to be worn when up and out of bed  - Activity restrictions include: No bending, no twisting, no lifting over 10 lbs  - Follow up w/Dr. Enmanuel Ibanez in 2 weeks  4. Bowel Regimen: Glycolax and Senokot-S  5. Pain control: Gabapentin TID for peripheral nerve pain& PRN Roxicodone for fracture pain  6. Muscle spasms: Zanaflex TID  7. Mobility: Advance as tolerates  8. Diet: Advance as tolerates  9. Will follow peripherally while inpatient. Please call with any questions or decline in neurological status    DISPO: Dispo timing to be determined by primary team once patient is medically stable for discharge. Patient was discussed with Dr. Enmanuel Ibanez who agrees with above assessment and plan.      Electronically signed by: ELIAS Adame CNP, APRN-CNP, 5/24/2022 11:48 AM  [phone]
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
## 2022-09-22 ENCOUNTER — HOSPITAL ENCOUNTER (OUTPATIENT)
Dept: MRI IMAGING | Age: 47
Discharge: HOME | End: 2022-09-22
Payer: COMMERCIAL

## 2022-09-22 DIAGNOSIS — S22.060K: ICD-10-CM

## 2022-09-22 DIAGNOSIS — M47.14 THORACIC MYELOPATHY: ICD-10-CM

## 2022-09-22 PROCEDURE — 72146 MRI CHEST SPINE W/O DYE: CPT

## 2022-10-11 ENCOUNTER — HOSPITAL ENCOUNTER (OUTPATIENT)
Dept: MRI IMAGING | Age: 47
Discharge: HOME | End: 2022-10-11
Payer: COMMERCIAL

## 2022-10-11 DIAGNOSIS — M50.00 CERVICAL DISC DISEASE WITH MYELOPATHY: ICD-10-CM

## 2022-10-11 PROCEDURE — 72141 MRI NECK SPINE W/O DYE: CPT
